# Patient Record
Sex: FEMALE | Race: WHITE | NOT HISPANIC OR LATINO | URBAN - METROPOLITAN AREA
[De-identification: names, ages, dates, MRNs, and addresses within clinical notes are randomized per-mention and may not be internally consistent; named-entity substitution may affect disease eponyms.]

---

## 2020-08-19 ENCOUNTER — INPATIENT (INPATIENT)
Facility: HOSPITAL | Age: 65
LOS: 6 days | Discharge: NURSING, REHAB OR LONG-TERM CARE | DRG: 871 | End: 2020-08-26
Attending: HOSPITALIST | Admitting: INTERNAL MEDICINE
Payer: MEDICARE

## 2020-08-19 VITALS
HEART RATE: 81 BPM | OXYGEN SATURATION: 99 % | RESPIRATION RATE: 20 BRPM | WEIGHT: 179.9 LBS | TEMPERATURE: 102 F | DIASTOLIC BLOOD PRESSURE: 101 MMHG | SYSTOLIC BLOOD PRESSURE: 131 MMHG

## 2020-08-19 DIAGNOSIS — R63.8 OTHER SYMPTOMS AND SIGNS CONCERNING FOOD AND FLUID INTAKE: ICD-10-CM

## 2020-08-19 DIAGNOSIS — R79.89 OTHER SPECIFIED ABNORMAL FINDINGS OF BLOOD CHEMISTRY: ICD-10-CM

## 2020-08-19 DIAGNOSIS — G93.41 METABOLIC ENCEPHALOPATHY: ICD-10-CM

## 2020-08-19 DIAGNOSIS — Z78.9 OTHER SPECIFIED HEALTH STATUS: Chronic | ICD-10-CM

## 2020-08-19 DIAGNOSIS — D64.9 ANEMIA, UNSPECIFIED: ICD-10-CM

## 2020-08-19 DIAGNOSIS — R74.0 NONSPECIFIC ELEVATION OF LEVELS OF TRANSAMINASE AND LACTIC ACID DEHYDROGENASE [LDH]: ICD-10-CM

## 2020-08-19 DIAGNOSIS — A41.9 SEPSIS, UNSPECIFIED ORGANISM: ICD-10-CM

## 2020-08-19 DIAGNOSIS — R74.8 ABNORMAL LEVELS OF OTHER SERUM ENZYMES: ICD-10-CM

## 2020-08-19 DIAGNOSIS — J18.9 PNEUMONIA, UNSPECIFIED ORGANISM: ICD-10-CM

## 2020-08-19 DIAGNOSIS — J96.01 ACUTE RESPIRATORY FAILURE WITH HYPOXIA: ICD-10-CM

## 2020-08-19 LAB
A1C WITH ESTIMATED AVERAGE GLUCOSE RESULT: 5.7 % — HIGH (ref 4–5.6)
ACANTHOCYTES BLD QL SMEAR: SLIGHT — SIGNIFICANT CHANGE UP
ALBUMIN SERPL ELPH-MCNC: 1.4 G/DL — LOW (ref 3.3–5)
ALBUMIN SERPL ELPH-MCNC: 2.4 G/DL — LOW (ref 3.3–5)
ALBUMIN SERPL ELPH-MCNC: 2.6 G/DL — LOW (ref 3.3–5)
ALBUMIN SERPL ELPH-MCNC: 2.9 G/DL — LOW (ref 3.3–5)
ALP SERPL-CCNC: 28 U/L — LOW (ref 40–120)
ALP SERPL-CCNC: 48 U/L — SIGNIFICANT CHANGE UP (ref 40–120)
ALP SERPL-CCNC: 52 U/L — SIGNIFICANT CHANGE UP (ref 40–120)
ALP SERPL-CCNC: 53 U/L — SIGNIFICANT CHANGE UP (ref 40–120)
ALT FLD-CCNC: 30 U/L — SIGNIFICANT CHANGE UP (ref 10–45)
ALT FLD-CCNC: 61 U/L — HIGH (ref 10–45)
ALT FLD-CCNC: 61 U/L — HIGH (ref 10–45)
ALT FLD-CCNC: 67 U/L — HIGH (ref 10–45)
AMMONIA BLD-MCNC: <10 UMOL/L — LOW (ref 11–55)
AMPHET UR-MCNC: NEGATIVE — SIGNIFICANT CHANGE UP
ANION GAP SERPL CALC-SCNC: 11 MMOL/L — SIGNIFICANT CHANGE UP (ref 5–17)
ANION GAP SERPL CALC-SCNC: 16 MMOL/L — SIGNIFICANT CHANGE UP (ref 5–17)
ANION GAP SERPL CALC-SCNC: 17 MMOL/L — SIGNIFICANT CHANGE UP (ref 5–17)
APAP SERPL-MCNC: <5 UG/ML — LOW (ref 10–30)
APPEARANCE UR: CLEAR — SIGNIFICANT CHANGE UP
APTT BLD: 30 SEC — SIGNIFICANT CHANGE UP (ref 27.5–35.5)
AST SERPL-CCNC: 124 U/L — HIGH (ref 10–40)
AST SERPL-CCNC: 137 U/L — HIGH (ref 10–40)
AST SERPL-CCNC: 156 U/L — HIGH (ref 10–40)
AST SERPL-CCNC: 62 U/L — HIGH (ref 10–40)
BACTERIA # UR AUTO: PRESENT /HPF
BARBITURATES UR SCN-MCNC: NEGATIVE — SIGNIFICANT CHANGE UP
BASE EXCESS BLDV CALC-SCNC: -11.7 MMOL/L — SIGNIFICANT CHANGE UP
BASOPHILS # BLD AUTO: 0 K/UL — SIGNIFICANT CHANGE UP (ref 0–0.2)
BASOPHILS NFR BLD AUTO: 0 % — SIGNIFICANT CHANGE UP (ref 0–2)
BENZODIAZ UR-MCNC: NEGATIVE — SIGNIFICANT CHANGE UP
BILIRUB SERPL-MCNC: 0.4 MG/DL — SIGNIFICANT CHANGE UP (ref 0.2–1.2)
BILIRUB SERPL-MCNC: 0.6 MG/DL — SIGNIFICANT CHANGE UP (ref 0.2–1.2)
BILIRUB SERPL-MCNC: 0.6 MG/DL — SIGNIFICANT CHANGE UP (ref 0.2–1.2)
BILIRUB SERPL-MCNC: 0.8 MG/DL — SIGNIFICANT CHANGE UP (ref 0.2–1.2)
BILIRUB UR-MCNC: ABNORMAL
BLD GP AB SCN SERPL QL: NEGATIVE — SIGNIFICANT CHANGE UP
BLD GP AB SCN SERPL QL: NEGATIVE — SIGNIFICANT CHANGE UP
BUN SERPL-MCNC: 30 MG/DL — HIGH (ref 7–23)
BUN SERPL-MCNC: 53 MG/DL — HIGH (ref 7–23)
BUN SERPL-MCNC: 54 MG/DL — HIGH (ref 7–23)
BUN SERPL-MCNC: 58 MG/DL — HIGH (ref 7–23)
BURR CELLS BLD QL SMEAR: PRESENT — SIGNIFICANT CHANGE UP
CALCIUM SERPL-MCNC: 4.3 MG/DL — CRITICAL LOW (ref 8.4–10.5)
CALCIUM SERPL-MCNC: 7.5 MG/DL — LOW (ref 8.4–10.5)
CALCIUM SERPL-MCNC: 8.3 MG/DL — LOW (ref 8.4–10.5)
CALCIUM SERPL-MCNC: 8.5 MG/DL — SIGNIFICANT CHANGE UP (ref 8.4–10.5)
CHLORIDE SERPL-SCNC: 100 MMOL/L — SIGNIFICANT CHANGE UP (ref 96–108)
CHLORIDE SERPL-SCNC: 102 MMOL/L — SIGNIFICANT CHANGE UP (ref 96–108)
CHLORIDE SERPL-SCNC: 122 MMOL/L — HIGH (ref 96–108)
CHLORIDE SERPL-SCNC: 92 MMOL/L — LOW (ref 96–108)
CHOLEST SERPL-MCNC: 161 MG/DL — SIGNIFICANT CHANGE UP (ref 10–199)
CK MB CFR SERPL CALC: 3.5 NG/ML — SIGNIFICANT CHANGE UP (ref 0–6.7)
CK SERPL-CCNC: 1664 U/L — HIGH (ref 25–170)
CO2 SERPL-SCNC: 13 MMOL/L — LOW (ref 22–31)
CO2 SERPL-SCNC: 19 MMOL/L — LOW (ref 22–31)
CO2 SERPL-SCNC: 22 MMOL/L — SIGNIFICANT CHANGE UP (ref 22–31)
CO2 SERPL-SCNC: 23 MMOL/L — SIGNIFICANT CHANGE UP (ref 22–31)
COCAINE METAB.OTHER UR-MCNC: NEGATIVE — SIGNIFICANT CHANGE UP
COLOR SPEC: YELLOW — SIGNIFICANT CHANGE UP
COMMENT - URINE: SIGNIFICANT CHANGE UP
CREAT SERPL-MCNC: 0.74 MG/DL — SIGNIFICANT CHANGE UP (ref 0.5–1.3)
CREAT SERPL-MCNC: 1.6 MG/DL — HIGH (ref 0.5–1.3)
CREAT SERPL-MCNC: 1.67 MG/DL — HIGH (ref 0.5–1.3)
CREAT SERPL-MCNC: 2 MG/DL — HIGH (ref 0.5–1.3)
DIFF PNL FLD: ABNORMAL
EOSINOPHIL # BLD AUTO: 0 K/UL — SIGNIFICANT CHANGE UP (ref 0–0.5)
EOSINOPHIL NFR BLD AUTO: 0 % — SIGNIFICANT CHANGE UP (ref 0–6)
EPI CELLS # UR: SIGNIFICANT CHANGE UP /HPF (ref 0–5)
ESTIMATED AVERAGE GLUCOSE: 117 MG/DL — HIGH (ref 68–114)
GIANT PLATELETS BLD QL SMEAR: PRESENT — SIGNIFICANT CHANGE UP
GLUCOSE BLDC GLUCOMTR-MCNC: 218 MG/DL — HIGH (ref 70–99)
GLUCOSE SERPL-MCNC: 116 MG/DL — HIGH (ref 70–99)
GLUCOSE SERPL-MCNC: 228 MG/DL — HIGH (ref 70–99)
GLUCOSE SERPL-MCNC: 302 MG/DL — HIGH (ref 70–99)
GLUCOSE SERPL-MCNC: SIGNIFICANT CHANGE UP (ref 70–99)
GLUCOSE UR QL: NEGATIVE — SIGNIFICANT CHANGE UP
GRAN CASTS # UR COMP ASSIST: ABNORMAL /LPF
HCO3 BLDV-SCNC: 12 MMOL/L — LOW (ref 20–27)
HCT VFR BLD CALC: 24.5 % — LOW (ref 34.5–45)
HCT VFR BLD CALC: 34.5 % — SIGNIFICANT CHANGE UP (ref 34.5–45)
HDLC SERPL-MCNC: 10 MG/DL — LOW
HGB BLD-MCNC: 11.4 G/DL — LOW (ref 11.5–15.5)
HGB BLD-MCNC: 7.8 G/DL — LOW (ref 11.5–15.5)
HYALINE CASTS # UR AUTO: ABNORMAL /LPF (ref 0–2)
INR BLD: 1.29 — HIGH (ref 0.88–1.16)
IRON SATN MFR SERPL: 13 % — LOW (ref 14–50)
IRON SATN MFR SERPL: 22 UG/DL — LOW (ref 30–160)
KETONES UR-MCNC: ABNORMAL MG/DL
LACTATE SERPL-SCNC: 2 MMOL/L — SIGNIFICANT CHANGE UP (ref 0.5–2)
LEUKOCYTE ESTERASE UR-ACNC: NEGATIVE — SIGNIFICANT CHANGE UP
LIPID PNL WITH DIRECT LDL SERPL: 82 MG/DL — SIGNIFICANT CHANGE UP
LYMPHOCYTES # BLD AUTO: 0.07 K/UL — LOW (ref 1–3.3)
LYMPHOCYTES # BLD AUTO: 0.8 % — LOW (ref 13–44)
MAGNESIUM SERPL-MCNC: 2.7 MG/DL — HIGH (ref 1.6–2.6)
MANUAL SMEAR VERIFICATION: SIGNIFICANT CHANGE UP
MCHC RBC-ENTMCNC: 29.5 PG — SIGNIFICANT CHANGE UP (ref 27–34)
MCHC RBC-ENTMCNC: 30.2 PG — SIGNIFICANT CHANGE UP (ref 27–34)
MCHC RBC-ENTMCNC: 31.8 GM/DL — LOW (ref 32–36)
MCHC RBC-ENTMCNC: 33 GM/DL — SIGNIFICANT CHANGE UP (ref 32–36)
MCV RBC AUTO: 91.3 FL — SIGNIFICANT CHANGE UP (ref 80–100)
MCV RBC AUTO: 92.8 FL — SIGNIFICANT CHANGE UP (ref 80–100)
METAMYELOCYTES # FLD: 0.9 % — HIGH (ref 0–0)
METHADONE UR-MCNC: NEGATIVE — SIGNIFICANT CHANGE UP
MONOCYTES # BLD AUTO: 0.28 K/UL — SIGNIFICANT CHANGE UP (ref 0–0.9)
MONOCYTES NFR BLD AUTO: 3.4 % — SIGNIFICANT CHANGE UP (ref 2–14)
NEUTROPHILS # BLD AUTO: 7.76 K/UL — HIGH (ref 1.8–7.4)
NEUTROPHILS NFR BLD AUTO: 90.6 % — HIGH (ref 43–77)
NEUTS BAND # BLD: 4.3 % — SIGNIFICANT CHANGE UP (ref 0–8)
NITRITE UR-MCNC: NEGATIVE — SIGNIFICANT CHANGE UP
NRBC # BLD: 0 /100 WBCS — SIGNIFICANT CHANGE UP (ref 0–0)
NT-PROBNP SERPL-SCNC: 464 PG/ML — HIGH (ref 0–300)
OPIATES UR-MCNC: NEGATIVE — SIGNIFICANT CHANGE UP
PCO2 BLDV: 20 MMHG — LOW (ref 41–51)
PCP SPEC-MCNC: SIGNIFICANT CHANGE UP
PCP UR-MCNC: NEGATIVE — SIGNIFICANT CHANGE UP
PH BLDV: 7.38 — SIGNIFICANT CHANGE UP (ref 7.32–7.43)
PH UR: 6 — SIGNIFICANT CHANGE UP (ref 5–8)
PLAT MORPH BLD: ABNORMAL
PLATELET # BLD AUTO: 174 K/UL — SIGNIFICANT CHANGE UP (ref 150–400)
PLATELET # BLD AUTO: 251 K/UL — SIGNIFICANT CHANGE UP (ref 150–400)
PO2 BLDV: 52 MMHG — SIGNIFICANT CHANGE UP
POIKILOCYTOSIS BLD QL AUTO: SIGNIFICANT CHANGE UP
POTASSIUM SERPL-MCNC: 2.2 MMOL/L — CRITICAL LOW (ref 3.5–5.3)
POTASSIUM SERPL-MCNC: 3.6 MMOL/L — SIGNIFICANT CHANGE UP (ref 3.5–5.3)
POTASSIUM SERPL-MCNC: 3.9 MMOL/L — SIGNIFICANT CHANGE UP (ref 3.5–5.3)
POTASSIUM SERPL-MCNC: 4.2 MMOL/L — SIGNIFICANT CHANGE UP (ref 3.5–5.3)
POTASSIUM SERPL-SCNC: 2.2 MMOL/L — CRITICAL LOW (ref 3.5–5.3)
POTASSIUM SERPL-SCNC: 3.6 MMOL/L — SIGNIFICANT CHANGE UP (ref 3.5–5.3)
POTASSIUM SERPL-SCNC: 3.9 MMOL/L — SIGNIFICANT CHANGE UP (ref 3.5–5.3)
POTASSIUM SERPL-SCNC: 4.2 MMOL/L — SIGNIFICANT CHANGE UP (ref 3.5–5.3)
PROT SERPL-MCNC: 4 G/DL — LOW (ref 6–8.3)
PROT SERPL-MCNC: 6.6 G/DL — SIGNIFICANT CHANGE UP (ref 6–8.3)
PROT SERPL-MCNC: 6.8 G/DL — SIGNIFICANT CHANGE UP (ref 6–8.3)
PROT SERPL-MCNC: 7.1 G/DL — SIGNIFICANT CHANGE UP (ref 6–8.3)
PROT UR-MCNC: 100 MG/DL
PROTHROM AB SERPL-ACNC: 15.3 SEC — HIGH (ref 10.6–13.6)
RBC # BLD: 2.64 M/UL — LOW (ref 3.8–5.2)
RBC # BLD: 3.78 M/UL — LOW (ref 3.8–5.2)
RBC # FLD: 13.7 % — SIGNIFICANT CHANGE UP (ref 10.3–14.5)
RBC # FLD: 14.2 % — SIGNIFICANT CHANGE UP (ref 10.3–14.5)
RBC BLD AUTO: ABNORMAL
RBC CASTS # UR COMP ASSIST: ABNORMAL /HPF
RH IG SCN BLD-IMP: POSITIVE — SIGNIFICANT CHANGE UP
RH IG SCN BLD-IMP: POSITIVE — SIGNIFICANT CHANGE UP
SALICYLATES SERPL-MCNC: <0.3 MG/DL — LOW (ref 2.8–20)
SAO2 % BLDV: 84 % — SIGNIFICANT CHANGE UP
SARS-COV-2 RNA SPEC QL NAA+PROBE: SIGNIFICANT CHANGE UP
SODIUM SERPL-SCNC: 138 MMOL/L — SIGNIFICANT CHANGE UP (ref 135–145)
SODIUM SERPL-SCNC: 142 MMOL/L — SIGNIFICANT CHANGE UP (ref 135–145)
SODIUM SERPL-SCNC: 146 MMOL/L — HIGH (ref 135–145)
SODIUM SERPL-SCNC: SIGNIFICANT CHANGE UP (ref 135–145)
SP GR SPEC: >=1.03 — SIGNIFICANT CHANGE UP (ref 1–1.03)
THC UR QL: NEGATIVE — SIGNIFICANT CHANGE UP
TIBC SERPL-MCNC: 165 UG/DL — LOW (ref 220–430)
TOTAL CHOLESTEROL/HDL RATIO MEASUREMENT: 16.1 RATIO — HIGH (ref 3.3–7.1)
TRIGL SERPL-MCNC: 343 MG/DL — HIGH (ref 10–149)
TROPONIN T SERPL-MCNC: 0.04 NG/ML — CRITICAL HIGH (ref 0–0.01)
TROPONIN T SERPL-MCNC: 0.06 NG/ML — CRITICAL HIGH (ref 0–0.01)
TROPONIN T SERPL-MCNC: <0.01 NG/ML — SIGNIFICANT CHANGE UP (ref 0–0.01)
UIBC SERPL-MCNC: 143 UG/DL — SIGNIFICANT CHANGE UP (ref 110–370)
UROBILINOGEN FLD QL: 1 E.U./DL — SIGNIFICANT CHANGE UP
WBC # BLD: 10.06 K/UL — SIGNIFICANT CHANGE UP (ref 3.8–10.5)
WBC # BLD: 8.18 K/UL — SIGNIFICANT CHANGE UP (ref 3.8–10.5)
WBC # FLD AUTO: 10.06 K/UL — SIGNIFICANT CHANGE UP (ref 3.8–10.5)
WBC # FLD AUTO: 8.18 K/UL — SIGNIFICANT CHANGE UP (ref 3.8–10.5)
WBC UR QL: < 5 /HPF — SIGNIFICANT CHANGE UP

## 2020-08-19 PROCEDURE — 72125 CT NECK SPINE W/O DYE: CPT | Mod: 26

## 2020-08-19 PROCEDURE — 72131 CT LUMBAR SPINE W/O DYE: CPT | Mod: 26

## 2020-08-19 PROCEDURE — 99223 1ST HOSP IP/OBS HIGH 75: CPT | Mod: GC

## 2020-08-19 PROCEDURE — 70450 CT HEAD/BRAIN W/O DYE: CPT | Mod: 26

## 2020-08-19 PROCEDURE — 71045 X-RAY EXAM CHEST 1 VIEW: CPT | Mod: 26

## 2020-08-19 PROCEDURE — 99291 CRITICAL CARE FIRST HOUR: CPT

## 2020-08-19 PROCEDURE — 93010 ELECTROCARDIOGRAM REPORT: CPT

## 2020-08-19 RX ORDER — ACETAMINOPHEN 500 MG
650 TABLET ORAL ONCE
Refills: 0 | Status: COMPLETED | OUTPATIENT
Start: 2020-08-19 | End: 2020-08-19

## 2020-08-19 RX ORDER — IPRATROPIUM/ALBUTEROL SULFATE 18-103MCG
3 AEROSOL WITH ADAPTER (GRAM) INHALATION ONCE
Refills: 0 | Status: COMPLETED | OUTPATIENT
Start: 2020-08-19 | End: 2020-08-19

## 2020-08-19 RX ORDER — VANCOMYCIN HCL 1 G
1000 VIAL (EA) INTRAVENOUS ONCE
Refills: 0 | Status: DISCONTINUED | OUTPATIENT
Start: 2020-08-19 | End: 2020-08-19

## 2020-08-19 RX ORDER — VANCOMYCIN HCL 1 G
1250 VIAL (EA) INTRAVENOUS ONCE
Refills: 0 | Status: COMPLETED | OUTPATIENT
Start: 2020-08-19 | End: 2020-08-19

## 2020-08-19 RX ORDER — SODIUM CHLORIDE 9 MG/ML
1000 INJECTION INTRAMUSCULAR; INTRAVENOUS; SUBCUTANEOUS ONCE
Refills: 0 | Status: COMPLETED | OUTPATIENT
Start: 2020-08-19 | End: 2020-08-19

## 2020-08-19 RX ORDER — POTASSIUM CHLORIDE 20 MEQ
10 PACKET (EA) ORAL ONCE
Refills: 0 | Status: DISCONTINUED | OUTPATIENT
Start: 2020-08-19 | End: 2020-08-19

## 2020-08-19 RX ORDER — GUAIFENESIN/DEXTROMETHORPHAN 600MG-30MG
10 TABLET, EXTENDED RELEASE 12 HR ORAL THREE TIMES A DAY
Refills: 0 | Status: DISCONTINUED | OUTPATIENT
Start: 2020-08-19 | End: 2020-08-26

## 2020-08-19 RX ORDER — AZITHROMYCIN 500 MG/1
500 TABLET, FILM COATED ORAL EVERY 24 HOURS
Refills: 0 | Status: COMPLETED | OUTPATIENT
Start: 2020-08-19 | End: 2020-08-21

## 2020-08-19 RX ORDER — SODIUM CHLORIDE 9 MG/ML
2500 INJECTION INTRAMUSCULAR; INTRAVENOUS; SUBCUTANEOUS ONCE
Refills: 0 | Status: COMPLETED | OUTPATIENT
Start: 2020-08-19 | End: 2020-08-19

## 2020-08-19 RX ORDER — POTASSIUM CHLORIDE 20 MEQ
10 PACKET (EA) ORAL ONCE
Refills: 0 | Status: COMPLETED | OUTPATIENT
Start: 2020-08-19 | End: 2020-08-19

## 2020-08-19 RX ORDER — ACETAMINOPHEN 500 MG
650 TABLET ORAL EVERY 4 HOURS
Refills: 0 | Status: DISCONTINUED | OUTPATIENT
Start: 2020-08-19 | End: 2020-08-26

## 2020-08-19 RX ORDER — FUROSEMIDE 40 MG
20 TABLET ORAL ONCE
Refills: 0 | Status: COMPLETED | OUTPATIENT
Start: 2020-08-19 | End: 2020-08-19

## 2020-08-19 RX ORDER — PIPERACILLIN AND TAZOBACTAM 4; .5 G/20ML; G/20ML
3.38 INJECTION, POWDER, LYOPHILIZED, FOR SOLUTION INTRAVENOUS ONCE
Refills: 0 | Status: COMPLETED | OUTPATIENT
Start: 2020-08-19 | End: 2020-08-19

## 2020-08-19 RX ORDER — CEFTRIAXONE 500 MG/1
2000 INJECTION, POWDER, FOR SOLUTION INTRAMUSCULAR; INTRAVENOUS EVERY 24 HOURS
Refills: 0 | Status: COMPLETED | OUTPATIENT
Start: 2020-08-19 | End: 2020-08-23

## 2020-08-19 RX ADMIN — AZITHROMYCIN 255 MILLIGRAM(S): 500 TABLET, FILM COATED ORAL at 19:30

## 2020-08-19 RX ADMIN — Medication 1250 MILLIGRAM(S): at 16:52

## 2020-08-19 RX ADMIN — PIPERACILLIN AND TAZOBACTAM 200 GRAM(S): 4; .5 INJECTION, POWDER, LYOPHILIZED, FOR SOLUTION INTRAVENOUS at 11:55

## 2020-08-19 RX ADMIN — SODIUM CHLORIDE 1000 MILLILITER(S): 9 INJECTION INTRAMUSCULAR; INTRAVENOUS; SUBCUTANEOUS at 16:47

## 2020-08-19 RX ADMIN — Medication 10 MILLILITER(S): at 19:33

## 2020-08-19 RX ADMIN — Medication 100 MILLIEQUIVALENT(S): at 14:19

## 2020-08-19 RX ADMIN — Medication 650 MILLIGRAM(S): at 11:56

## 2020-08-19 RX ADMIN — SODIUM CHLORIDE 2500 MILLILITER(S): 9 INJECTION INTRAMUSCULAR; INTRAVENOUS; SUBCUTANEOUS at 11:57

## 2020-08-19 RX ADMIN — PIPERACILLIN AND TAZOBACTAM 3.38 GRAM(S): 4; .5 INJECTION, POWDER, LYOPHILIZED, FOR SOLUTION INTRAVENOUS at 13:15

## 2020-08-19 RX ADMIN — Medication 10 MILLIEQUIVALENT(S): at 14:56

## 2020-08-19 RX ADMIN — Medication 650 MILLIGRAM(S): at 21:51

## 2020-08-19 RX ADMIN — Medication 10 MILLILITER(S): at 21:33

## 2020-08-19 RX ADMIN — Medication 650 MILLIGRAM(S): at 22:30

## 2020-08-19 RX ADMIN — Medication 3 MILLILITER(S): at 18:16

## 2020-08-19 RX ADMIN — Medication 166.67 MILLIGRAM(S): at 13:14

## 2020-08-19 RX ADMIN — CEFTRIAXONE 100 MILLIGRAM(S): 500 INJECTION, POWDER, FOR SOLUTION INTRAMUSCULAR; INTRAVENOUS at 21:33

## 2020-08-19 RX ADMIN — Medication 20 MILLIGRAM(S): at 18:50

## 2020-08-19 RX ADMIN — Medication 650 MILLIGRAM(S): at 12:40

## 2020-08-19 NOTE — ED PROVIDER NOTE - CARE PLAN
Principal Discharge DX:	Sepsis  Secondary Diagnosis:	Pneumonia Principal Discharge DX:	Sepsis  Secondary Diagnosis:	Pneumonia  Secondary Diagnosis:	Dehydration

## 2020-08-19 NOTE — H&P ADULT - NSHPLABSRESULTS_GEN_ALL_CORE
.  LABS:                         7.8    8.18  )-----------( 174      ( 19 Aug 2020 11:46 )             24.5     08-19    142  |  102  |  54<H>  ----------------------------<  228<H>  3.9   |  23  |  1.67<H>    Ca    8.5      19 Aug 2020 13:49  Mg     2.7     08-19    TPro  7.1  /  Alb  2.9<L>  /  TBili  0.8  /  DBili  x   /  AST  124<H>  /  ALT  61<H>  /  AlkPhos  52  08-19    PT/INR - ( 19 Aug 2020 11:46 )   PT: 15.3 sec;   INR: 1.29          PTT - ( 19 Aug 2020 11:46 )  PTT:30.0 sec  Urinalysis Basic - ( 19 Aug 2020 12:22 )    Color: Yellow / Appearance: Clear / SG: >=1.030 / pH: x  Gluc: x / Ketone: Trace mg/dL  / Bili: Moderate / Urobili: 1.0 E.U./dL   Blood: x / Protein: 100 mg/dL / Nitrite: NEGATIVE   Leuk Esterase: NEGATIVE / RBC: 5-10 /HPF / WBC < 5 /HPF   Sq Epi: x / Non Sq Epi: 0-5 /HPF / Bacteria: Present /HPF      CARDIAC MARKERS ( 19 Aug 2020 13:49 )  x     / 0.06 ng/mL / 4072 U/L / x     / x      CARDIAC MARKERS ( 19 Aug 2020 11:46 )  x     / <0.01 ng/mL / 1664 U/L / x     / 3.5 ng/mL      Serum Pro-Brain Natriuretic Peptide: 464 pg/mL (08-19 @ 11:46)    Lactate, Blood: 2.0 mmol/L (08-19 @ 11:43)    EKG: sinus tachycardia  CXR: LLL opacity  CTH: no acute pathology  CT C-spine: mild chord compression  CT L-spine: At the L4/5 level, there is minimum disc bulge eccentric to the right which abuts the right L4 nerve root

## 2020-08-19 NOTE — H&P ADULT - NSHPPHYSICALEXAM_GEN_ALL_CORE
.  VITAL SIGNS:  T(C): 36.7 (08-19-20 @ 18:21), Max: 38.8 (08-19-20 @ 11:11)  T(F): 98.1 (08-19-20 @ 18:21), Max: 101.8 (08-19-20 @ 11:11)  HR: 116 (08-19-20 @ 18:21) (81 - 118)  BP: 125/84 (08-19-20 @ 18:21) (110/57 - 131/101)  BP(mean): --  RR: 22 (08-19-20 @ 18:21) (18 - 22)  SpO2: 94% (08-19-20 @ 18:21) (94% - 100%)  Wt(kg): --    PHYSICAL EXAM:    Constitutional: obese woman on 2LNC, coughing consistently and unable to speak in complete sentences due to cough  Head: NC/AT  ENT: dry mucous membranes  Neck: supple; no JVD or thyromegaly  Respiratory: diffuse rhonchi and coarse breath sounds. crackles in posterior lung fields b/l  Cardiac: +S1/S2; RRR; no M/R/G; PMI non-displaced  Gastrointestinal: soft, NT/ND; no rebound or guarding; +BSx4  Extremities: extremities cool to touch; no peripheral edema  Vascular: 2+ radial, femoral, DP/PT pulses B/L  Dermatologic: stage 2 sacral decubitus ulcer  Neurologic: AAOx3; CNII-XII grossly intact; no focal deficits

## 2020-08-19 NOTE — ED ADULT NURSE NOTE - CHEST APPEARANCE
"Met with the patient in the office after a referral from Bria Odom, PhD for possible financial assistance. The patient is living with her daughters ages 16 and 18. She is still working full time as a teacher and informed that she would like to work as long as possible. The patient stated that her life "is still the same as before", but she now had co-payments to take care of. She did not qualify for Medicaid. Provided her with contact information for the  at [facility] and encouraged her to request an application for [facility]. Also provided her with an application for Financial Assistance through [email or web address]. Offered services to the patient and provided her with contact information. She agreed to call back for further assistance as needed.   " normal

## 2020-08-19 NOTE — H&P ADULT - PROBLEM SELECTOR PLAN 1
-SIRS criteria met: Tmax 100.8, , RR 20)  -likely PNA given LLL opacity on CXR with new onset productive cough and fevers  -UA negative  -s/p 3.7L NS, vanc, zosyn in ED    Plan:  -tx CAP as discussed below  -cautious with fluids given possible overload status

## 2020-08-19 NOTE — ED PROVIDER NOTE - PHYSICAL EXAMINATION
GEN: obese, ill-apearing, awake, alert, oriented to person, febrile and confused  ENT: Airway patent, Nasal mucosa clear. Mouth with dry mucosa.  EYES: crusted eyelids, PERRL, EOMI  RESPIRATORY: rhonchi and crackels b/l, LLL crackles. mild tachypnea, no hypoxia or resp distress.  CARDIAC: sius tach no m/r/g.  ABDOMEN: Soft, nontender, +bowel sounds, no rebound, rigidity, or guarding.  MSK: Range of motion is not limited, no deformities noted. Mild LS TTP.  NEURO: Alert and oriented x 1-2 generally weak, pt confused and not following commands.  SKIN: Skin normal color for race, warm with mottled extremities. Abrasion noted to mid low back with LS TTP, no step off or deformities.

## 2020-08-19 NOTE — H&P ADULT - PROBLEM SELECTOR PLAN 10
F: caution with fluids given overload status  E: replete PRN  N: pending bedside dysphagia screen  DVT ppx: lovenox  GI ppx: not indicated    Code Status: Full Code  Dispo: [facility] F: caution with fluids given overload status  E: replete PRN  N: pending bedside dysphagia screen  DVT ppx: SCDs hold off chemical due to anemia of unknown cause  GI ppx: not indicated    Code Status: Full Code  Dispo: [id number]

## 2020-08-19 NOTE — CONSULT NOTE ADULT - ASSESSMENT
65F with no significant PMHx, BIBEMS after being found down at home for ~3d, complaining of confusion, cough and back pain, admitted for sepsis 2/2 LLL pneumonia. ICU was consulted for AMS and sepsis.    PLAN:    NEURO:  #AMS, likely 2/2 sepsis 2/2 PNA  -Pt was found down for ~3d with AMS, unknown when last normal. CTH negative for any acute pathology, tox screen negative.  -Treat sepsis as below.    PULMONARY:  #LLL PNA, likely CAP as pt has been home with no recent hospitalizations  -Pt presenting w sepsis (Tmax 100.8, , RR 20), AMS, productive cough and LLL opacity on CXR c/w PNA. Received 1 dose of Vanc/Zosyn in ED, 1L NS. Currently able to protect airway.   -f/u blood cultures  -f/u procalcitonin  -please send sputum culture  -treat as CAP with Ceftriaxone and Azithromycin, and adjust as needed per culture sensitivities/specificities  -supplemental O2 as needed    CARDIOVASCULAR:  #sinus tachycardia  -likely secondary to sepsis  -continue to treat PNA as above  -pt with no known cardiac history, however sounded fluid overloaded on exam after receiving 1L NS  -consider ECHO    RENAL:  #HERI (Cr 1.67, unknown baseline)  -likely pre-renal 2/2 dehydration and sepsis, obtain urine lytes to confirm    ID:  #sepsis 2/2 CAP  -management as above    GI:  -no active issues    :  -no active issues    DISPO - RMF 65F with no significant PMHx, BIBEMS after being found down at home for ~3d, complaining of confusion, cough and back pain, admitted for sepsis 2/2 LLL pneumonia. ICU was consulted for AMS and sepsis.    PLAN:    NEURO:  #metabolic encephalopathy 2/2 sepsis 2/2 PNA  -Pt was found down for ~3d with AMS, unknown when last normal. CTH negative for any acute pathology, tox screen negative.  -Treat sepsis as below.    PULMONARY:  #LLL PNA, likely CAP as pt has been home with no recent hospitalizations  -Pt presenting w sepsis (Tmax 100.8, , RR 20), AMS, productive cough and LLL opacity on CXR c/w PNA. Received 1 dose of Vanc/Zosyn in ED, 1L NS. Currently able to protect airway.   -f/u blood cultures  -f/u procalcitonin  -please send sputum culture  -treat as CAP with Ceftriaxone and Azithromycin, and adjust as needed per culture sensitivities/specificities  -supplemental O2 as needed    CARDIOVASCULAR:  #sinus tachycardia  -likely secondary to sepsis  -continue to treat PNA as above  -pt with no known cardiac history, however sounded fluid overloaded on exam after receiving 1L NS  -consider ECHO    RENAL:  #HERI (Cr 1.67, unknown baseline)  -likely pre-renal 2/2 dehydration and sepsis, obtain urine lytes to confirm    ID:  #sepsis 2/2 CAP (met 3 sepsis criteria: Tmax 100.8, , RR 20)  -management as above    GI:  -no active issues    :  -no active issues    DISPO - RMF 65F with no significant PMHx, BIBEMS after being found down at home for ~3d, complaining of confusion, cough and back pain, admitted for sepsis 2/2 LLL pneumonia. ICU was consulted for AMS and sepsis.    PLAN:    NEURO:  #metabolic encephalopathy 2/2 sepsis 2/2 PNA  -Pt was found down for ~3d, with no known/endorsed fall/trauma, cardiac or neurological pathology, no electrolyte abnormalities, no evidence of toxic ingestion.   (Initial BMP showing hypokalemia which would cause muscle weakness and could potentially lead to a fall, however, most likely lab error, as repeat BMP serum potassium wnl)  -management of sepsis 2/2 CAP as below    PULMONARY:  #LLL PNA, likely CAP as pt has been home with no recent hospitalizations  -Pt presenting w sepsis (Tmax 100.8, , RR 20), AMS, productive cough and LLL opacity on CXR c/w PNA. Received 1 dose of Vanc/Zosyn in ED, 1L NS. Currently able to protect airway.   -f/u blood cultures  -f/u procalcitonin  -please send sputum culture  -treat as CAP with Ceftriaxone and Azithromycin, and adjust as needed per culture sensitivities/specificities  -supplemental O2 as needed    CARDIOVASCULAR:  #sinus tachycardia  -likely secondary to sepsis  -continue to treat PNA as above  -pt with no known cardiac history, however sounded fluid overloaded on exam after receiving 1L NS  -consider ECHO    RENAL:  #HERI (Cr 1.67, unknown baseline)  -likely pre-renal 2/2 dehydration and sepsis, obtain urine lytes to confirm    ID:  #sepsis 2/2 CAP (met 3 sepsis criteria: Tmax 100.8, , RR 20)  -management as above    GI:  -no active issues    :  -no active issues    DISPO - RMF 65F with no significant PMHx, BIBEMS after being found down at home for ~3d, complaining of confusion, cough and back pain, admitted for sepsis 2/2 LLL pneumonia. ICU was consulted for AMS and sepsis.    PLAN:    NEURO:  #metabolic encephalopathy 2/2 sepsis 2/2 PNA  -Pt was found down for ~3d, with no known/endorsed fall/trauma, cardiac or neurological pathology, no electrolyte abnormalities, no evidence of toxic ingestion.   (Initial BMP showing hypokalemia which would cause muscle weakness and could potentially lead to a fall, however, most likely lab error, as repeat BMP serum potassium wnl)  -management of sepsis 2/2 CAP as below    PULMONARY:  #LLL PNA, likely CAP as pt has been home with no recent hospitalizations  -Pt presenting w sepsis (Tmax 100.8, , RR 20), AMS, productive cough and LLL opacity on CXR c/w PNA. Received 1 dose of Vanc/Zosyn in ED, 1L NS. Currently able to protect airway.   -f/u blood cultures  -f/u procalcitonin  -please send sputum culture  -treat as CAP with Ceftriaxone and Azithromycin, and adjust as needed per culture sensitivities/specificities  -supplemental O2 as needed    CARDIOVASCULAR:  #sinus tachycardia  -likely secondary to sepsis  -continue to treat PNA as above    #elevated troponin  -initial labs showing troponin 0.01, however on repeat, troponin 0.06, bnp 464  -could be demand ischemia, trend to peak  -pt with no known history of HF, however sounded fluid overloaded on exam after receiving 1L NS  -consider ECHO    RENAL:  #HERI (Cr 1.67, unknown baseline)  -likely pre-renal 2/2 dehydration and sepsis, obtain urine lytes to confirm    ID:  #sepsis 2/2 CAP (met 3 sepsis criteria: Tmax 100.8, , RR 20)  -management as above    GI:  -no active issues    :  -no active issues    DISPO - RMF

## 2020-08-19 NOTE — CONSULT NOTE ADULT - SUBJECTIVE AND OBJECTIVE BOX
[facility] SERVICE CONSULTATION NOTE    CC:    HPI:    ROS:  Otherwise negative, except as specified in HPI.    PMH:    PSH:    FH:    SH:    ALLERGIES:    MEDICATIONS:    VITAL SIGNS:  ICU Vital Signs Last 24 Hrs  T(C): 37.2 (19 Aug 2020 14:11), Max: 38.8 (19 Aug 2020 11:11)  T(F): 98.9 (19 Aug 2020 14:11), Max: 101.8 (19 Aug 2020 11:11)  HR: 108 (19 Aug 2020 13:24) (81 - 118)  BP: 110/57 (19 Aug 2020 13:24) (110/57 - 131/101)  BP(mean): --  ABP: --  ABP(mean): --  RR: 20 (19 Aug 2020 13:24) (20 - 20)  SpO2: 99% (19 Aug 2020 13:24) (98% - 99%)    CAPILLARY BLOOD GLUCOSE          PHYSICAL EXAM:  Constitutional: resting comfortably in bed, NAD  HEENT: NC/AT; PERRL, anicteric sclera; no oropharyngeal erythema or exudates; MMM  Neck: supple, no appreciable JVD  Respiratory: CTA B/L, no W/R/R; respirations appear non-labored, conversive in full sentences  Cardiovascular: +S1/S2, RRR  Gastrointestinal: abdomen soft, NT/ND  Extremities: WWP; no clubbing, cyanosis or edema  Vascular: 2+ radial, femoral, and DP/PT pulses B/L  Dermatologic: skin normal color and turgor; no visible rashes  Neurological:     LABS:                        7.8    8.18  )-----------( 174      ( 19 Aug 2020 11:46 )             24.5     08-19    142  |  102  |  54<H>  ----------------------------<  228<H>  3.9   |  23  |  1.67<H>    Ca    8.5      19 Aug 2020 13:49  Mg     2.7     08-19    TPro  7.1  /  Alb  2.9<L>  /  TBili  0.8  /  DBili  x   /  AST  124<H>  /  ALT  61<H>  /  AlkPhos  52  08-19    PT/INR - ( 19 Aug 2020 11:46 )   PT: 15.3 sec;   INR: 1.29          PTT - ( 19 Aug 2020 11:46 )  PTT:30.0 sec  Lactate, Blood: 2.0 mmol/L (08-19-20 @ 11:43)    CARDIAC MARKERS ( 19 Aug 2020 11:46 )  x     / <0.01 ng/mL / 1664 U/L / x     / 3.5 ng/mL      Urinalysis Basic - ( 19 Aug 2020 12:22 )    Color: Yellow / Appearance: Clear / SG: >=1.030 / pH: x  Gluc: x / Ketone: Trace mg/dL  / Bili: Moderate / Urobili: 1.0 E.U./dL   Blood: x / Protein: 100 mg/dL / Nitrite: NEGATIVE   Leuk Esterase: NEGATIVE / RBC: 5-10 /HPF / WBC < 5 /HPF   Sq Epi: x / Non Sq Epi: 0-5 /HPF / Bacteria: Present /HPF          EKG: Reviewed.    RADIOLOGY & ADDITIONAL TESTS: Reviewed. [facility] SERVICE CONSULTATION NOTE    CC: confusion, cough    HPI: History obtained from chart and patient, limited by patient's confusion. 65F who denies PMHX, lives alone,  BIBEMS for AMS. Per EMS, someone in her building noticed she had a [employer] delivery outside her door from Sunday (3 days ago) so they checked in on her and found her down on the ground. Pt has no recollection of recent events leading up to her hospitalization. She states "I guess I must have passed out." She endorses feeling weak, productive cough, back pain and thirst. Denies any lightheadedness, fevers, HA, dizziness, CP, SOB, N/V/D/C, abdominal pain, dysuria, sick contacts or recent travel. Denies alcohol or illicit drug use.     ED Vitals: Tmax 100.8,  -130/ RR 20 SpO2 98% on 2L NC  ED Labs: WBC 8.18 with neutrophilic predominance, H/H 7.8/24.5 BUN/Cr 54/1.67 Glucose 228 lactate wnl AST//61, CK 1664, proBNP 464, trop wnl, negative tox screen  CXR: LLL opacity  EKG: sinus tachycardia  CTH: no acute pathology  ED Course: Received Tylenol 650mg, Vancomycin 1250mg, Zosyn 3.375g, 1L NS    ROS:  Otherwise negative, except as specified in HPI.    PMH: no significant PMH    PSH: no significant PSH    FH: diabetes, CAD    SH: Denies smoking, alcohol or illicit drug use. Lives alone. Closest relative is a sister who lives in [place], who has not spoken to her in 2 years.     ALLERGIES: NKDA    MEDICATIONS: none    VITAL SIGNS:  ICU Vital Signs Last 24 Hrs  T(C): 37.2 (19 Aug 2020 14:11), Max: 38.8 (19 Aug 2020 11:11)  T(F): 98.9 (19 Aug 2020 14:11), Max: 101.8 (19 Aug 2020 11:11)  HR: 108 (19 Aug 2020 13:24) (81 - 118)  BP: 110/57 (19 Aug 2020 13:24) (110/57 - 131/101)  BP(mean): --  ABP: --  ABP(mean): --  RR: 20 (19 Aug 2020 13:24) (20 - 20)  SpO2: 99% (19 Aug 2020 13:24) (98% - 99%)    CAPILLARY BLOOD GLUCOSE          PHYSICAL EXAM:  Constitutional: resting comfortably in bed, NAD  HEENT: NC/AT; PERRL, anicteric sclera; no oropharyngeal erythema or exudates; MMM  Neck: supple, no appreciable JVD  Respiratory: tachypneic, b/l crackles, no wheezing, rales, rhonchi, able to speak in full sentences on 2L NC, no accessory muscle use  Cardiovascular: +S1/S2, RRR, no m/r/g  Gastrointestinal: abdomen soft, NT/ND, BS+4  Back: bruise and abrasion on L upper back  Extremities: cool to touch; no clubbing, cyanosis or edema  Vascular: 2+ radial, femoral, DP/PT pulses B/L  Dermatologic: skin normal color and turgor; no visible rashes  Neurological: AAOx2; patient intermittently confused, cranial nerves grossly intact, no focal neurological deficits    LABS:                        7.8    8.18  )-----------( 174      ( 19 Aug 2020 11:46 )             24.5     08-19    142  |  102  |  54<H>  ----------------------------<  228<H>  3.9   |  23  |  1.67<H>    Ca    8.5      19 Aug 2020 13:49  Mg     2.7     08-19    TPro  7.1  /  Alb  2.9<L>  /  TBili  0.8  /  DBili  x   /  AST  124<H>  /  ALT  61<H>  /  AlkPhos  52  08-19    PT/INR - ( 19 Aug 2020 11:46 )   PT: 15.3 sec;   INR: 1.29          PTT - ( 19 Aug 2020 11:46 )  PTT:30.0 sec  Lactate, Blood: 2.0 mmol/L (08-19-20 @ 11:43)    CARDIAC MARKERS ( 19 Aug 2020 11:46 )  x     / <0.01 ng/mL / 1664 U/L / x     / 3.5 ng/mL      Urinalysis Basic - ( 19 Aug 2020 12:22 )    Color: Yellow / Appearance: Clear / SG: >=1.030 / pH: x  Gluc: x / Ketone: Trace mg/dL  / Bili: Moderate / Urobili: 1.0 E.U./dL   Blood: x / Protein: 100 mg/dL / Nitrite: NEGATIVE   Leuk Esterase: NEGATIVE / RBC: 5-10 /HPF / WBC < 5 /HPF   Sq Epi: x / Non Sq Epi: 0-5 /HPF / Bacteria: Present /HPF          EKG: Reviewed.    RADIOLOGY & ADDITIONAL TESTS: Reviewed.

## 2020-08-19 NOTE — H&P ADULT - ASSESSMENT
65F with no significant PMHx, BIBEMS after being found down at home for ~3d, complaining of confusion, cough and back pain, admitted for sepsis 2/2 LLL pneumonia. ICU was consulted for AMS and sepsis. 65F with no significant PMHx, BIBEMS after being found down at home for ~3days, complaining of confusion, cough and back pain, admitted for sepsis 2/2 LLL pneumonia s/p fluids in ED with possible fluid overload.

## 2020-08-19 NOTE — H&P ADULT - HISTORY OF PRESENT ILLNESS
HPI: History obtained from chart and patient, limited by patient's confusion. 65F who denies PMHX, lives alone,  BIBEMS for AMS. Per EMS, someone in her building noticed she had a [employer] delivery outside her door from Sunday (3 days ago) so they checked in on her and found her down on the ground. Pt has no recollection of recent events leading up to her hospitalization. She states "I guess I must have passed out." She endorses feeling weak, productive cough, back pain and thirst. Denies any lightheadedness, fevers, HA, dizziness, CP, SOB, N/V/D/C, abdominal pain, dysuria, sick contacts or recent travel. Denies alcohol or illicit drug use.     ED Course:  Vitals: Tmax 100.8,  -130/ RR 20 SpO2 98% on 2L NC  Labs: WBC 8.18 with neutrophilic predominance, H/H 7.8/24.5 BUN/Cr 54/1.67 Glucose 228 lactate wnl AST//61, CK 1664, proBNP 464, trop wnl, negative tox screen  Imaging:  CXR: LLL opacity  EKG: sinus tachycardia  CTH: no acute pathology  CT C-spine: mild chord compression  CT L-spine: At the L4/5 level, there is minimum disc bulge eccentric to the right which abuts the right L4 nerve root  Treatment: Received Tylenol 650mg, Vancomycin 1250mg, Zosyn 3.375g, 1L NS HPI: History obtained from chart and patient, limited by patient's confusion. 65F who denies PMHX, lives alone, BIBEMS for AMS. Per EMS, someone in her building noticed she had a [employer] delivery outside her door from Sunday (3 days ago) so they checked in on her and found her down on the ground. Pt has no recollection of recent events leading up to her hospitalization. She states "I guess I got dehydrated and passed out." She endorses feeling weak, productive cough, back pain and thirst. Denies any lightheadedness, fevers, HA, dizziness, CP, SOB, N/V/D/C, abdominal pain, dysuria, sick contacts or recent travel. Denies alcohol or illicit drug use.     ED Course:  Vitals: Tmax 100.8,  -130/ RR 20 SpO2 98% on 2L NC  Labs: WBC 8.18 with neutrophilic predominance, H/H 7.8/24.5 BUN/Cr 54/1.67 Glucose 228 lactate wnl AST//61, CK 1664, proBNP 464, trop wnl, negative tox screen  Imaging:  CXR: LLL opacity, cardiomegaly  EKG: sinus tachycardia  CTH: no acute pathology  CT C-spine: mild chord compression  CT L-spine: At the L4/5 level, there is minimum disc bulge eccentric to the right which abuts the right L4 nerve root  Treatment: Received Tylenol 650mg, Vancomycin 1250mg, Zosyn 3.375g, 3.7L NS

## 2020-08-19 NOTE — ED ADULT TRIAGE NOTE - CHIEF COMPLAINT QUOTE
pt was found of floor by EMS, as per EMS pt had food delivered Sunday to front door & pt never picked up the food. pt was last seen normal Sunday. pt is aaox2

## 2020-08-19 NOTE — CONSULT NOTE ADULT - SUBJECTIVE AND OBJECTIVE BOX
Orthopaedic Surgery Consult Note    For Surgeon:    HPI:  [id number]  Patient is a 65y old  Female who presents with a chief complaint of AMS, cough (19 Aug 2020 17:39)    HPI:  HPI: History obtained from chart and patient, limited by patient's confusion. 65F who denies PMHX, lives alone, BIBEMS for AMS. Per EMS, someone in her building noticed she had a [employer] delivery outside her door from Sunday (3 days ago) so they checked in on her and found her down on the ground. Pt has no recollection of recent events leading up to her hospitalization. She states "I guess I got dehydrated and passed out." She endorses feeling weak, productive cough, back pain and thirst. Denies any lightheadedness, fevers, HA, dizziness, CP, SOB, N/V/D/C, abdominal pain, dysuria, sick contacts or recent travel. Denies alcohol or illicit drug use.     ED Course:  Vitals: Tmax 100.8,  -130/ RR 20 SpO2 98% on 2L NC  Labs: WBC 8.18 with neutrophilic predominance, H/H 7.8/24.5 BUN/Cr 54/1.67 Glucose 228 lactate wnl AST//61, CK 1664, proBNP 464, trop wnl, negative tox screen  Imaging:  CXR: LLL opacity, cardiomegaly  EKG: sinus tachycardia  CTH: no acute pathology  CT C-spine: mild chord compression  CT L-spine: At the L4/5 level, there is minimum disc bulge eccentric to the right which abuts the right L4 nerve root  Treatment: Received Tylenol 650mg, Vancomycin 1250mg, Zosyn 3.375g, 3.7L NS (19 Aug 2020 17:39)      Allergies    No Known Allergies    Intolerances      PAST MEDICAL & SURGICAL HISTORY:  Known health problems: none  No known problems    MEDICATIONS  (STANDING):  azithromycin  IVPB 500 milliGRAM(s) IV Intermittent every 24 hours  cefTRIAXone   IVPB 2000 milliGRAM(s) IV Intermittent every 24 hours    MEDICATIONS  (PRN):  acetaminophen   Tablet .. 650 milliGRAM(s) Oral every 4 hours PRN Mild Pain (1 - 3)  benzonatate 100 milliGRAM(s) Oral three times a day PRN Cough  guaifenesin/dextromethorphan  Syrup 10 milliLiter(s) Oral three times a day PRN Cough      Vital Signs Last 24 Hrs  T(C): 38.1 (19 Aug 2020 20:53), Max: 38.8 (19 Aug 2020 11:11)  T(F): 100.5 (19 Aug 2020 20:53), Max: 101.8 (19 Aug 2020 11:11)  HR: 118 (19 Aug 2020 20:53) (81 - 118)  BP: 114/74 (19 Aug 2020 20:53) (110/57 - 131/101)  BP(mean): --  RR: 22 (19 Aug 2020 20:53) (18 - 22)  SpO2: 94% (19 Aug 2020 20:53) (94% - 100%)    Physical Exam:                          7.8    8.18  )-----------( 174      ( 19 Aug 2020 11:46 )             24.5     08-19    See Note  |  92<L>  |  53<H>  ----------------------------<  See Note  4.2   |  19<L>  |  1.60<H>    Ca    7.5<L>      19 Aug 2020 20:45  Mg     2.7     08-19    TPro  6.8  /  Alb  2.4<L>  /  TBili  0.6  /  DBili  x   /  AST  137<H>  /  ALT  61<H>  /  AlkPhos  48  08-19    PT/INR - ( 19 Aug 2020 11:46 )   PT: 15.3 sec;   INR: 1.29          PTT - ( 19 Aug 2020 11:46 )  PTT:30.0 sec  Imaging:     A/P: 65yFemale    -Discussed with  Orthopaedic Surgery Consult Note    For Surgeon: [place]    HPI:  65F very lethargic and poor historian admitted to medicine for altered mental status. Ortho spine consulted for CT C spine findings of mild cord compression. Patient seen and examined at bedside. Appear lethargic and unable to obtain history. Per reports, was found down in her apartment, unknown cause or length of time down. Denies neck pain, back pain, numbness or tingling in bilateral upper extremities. Denies any recent falls or trauma. No MVC.       Allergies    No Known Allergies    Intolerances      PAST MEDICAL & SURGICAL HISTORY:  Known health problems: none  No known problems    MEDICATIONS  (STANDING):  azithromycin  IVPB 500 milliGRAM(s) IV Intermittent every 24 hours  cefTRIAXone   IVPB 2000 milliGRAM(s) IV Intermittent every 24 hours    MEDICATIONS  (PRN):  acetaminophen   Tablet .. 650 milliGRAM(s) Oral every 4 hours PRN Mild Pain (1 - 3)  benzonatate 100 milliGRAM(s) Oral three times a day PRN Cough  guaifenesin/dextromethorphan  Syrup 10 milliLiter(s) Oral three times a day PRN Cough      Vital Signs Last 24 Hrs  T(C): 38.1 (19 Aug 2020 20:53), Max: 38.8 (19 Aug 2020 11:11)  T(F): 100.5 (19 Aug 2020 20:53), Max: 101.8 (19 Aug 2020 11:11)  HR: 118 (19 Aug 2020 20:53) (81 - 118)  BP: 114/74 (19 Aug 2020 20:53) (110/57 - 131/101)  BP(mean): --  RR: 22 (19 Aug 2020 20:53) (18 - 22)  SpO2: 94% (19 Aug 2020 20:53) (94% - 100%)    Physical Exam:  Gen: Lethargic but arousable   Neck supple  Nontender to palpation   Midline with no step offs  BL UE sensation intact and symmetric   BL UE motor strength intact and symmetric   2+ rad pulse  No lane sign   Biceps and triceps reflexes intact  No focal deficits   No clonus or hyperreflexia                         7.8    8.18  )-----------( 174      ( 19 Aug 2020 11:46 )             24.5     08-19    See Note  |  92<L>  |  53<H>  ----------------------------<  See Note  4.2   |  19<L>  |  1.60<H>    Ca    7.5<L>      19 Aug 2020 20:45  Mg     2.7     08-19    TPro  6.8  /  Alb  2.4<L>  /  TBili  0.6  /  DBili  x   /  AST  137<H>  /  ALT  61<H>  /  AlkPhos  48  08-19    PT/INR - ( 19 Aug 2020 11:46 )   PT: 15.3 sec;   INR: 1.29          PTT - ( 19 Aug 2020 11:46 )  PTT:30.0 sec  Imaging:   CT C-spine:  Impression: Multilevel degenerative changes of the cervical spine with no evidence of acute fractures.     C4/C5 disc osteophyte complex with moderate to severe right and mild left foraminal narrowing. C4/C5 mild spinal cord compression.     C5/C6 disc osteophyte complex with moderate to severe bilateral foraminal narrowing. C5/C6 mild spinal cord compression.     C6/C7 disc osteophyte complex with moderate to severe left and moderate right foraminal narrowing. C6/C7 mild spinal cord compression.   CT L-spine  IMPRESSION: No fracture. Multilevel degenerative disc disease. Disc bulge at L4/5 abutting the right L4 nerve root and correlation with the patient's clinical symptoms is recommended.      A/P: 65F admitted for AMS and workup CT C-spine showing C4-5, C5-6 and C6-7 mild spinal cord compression with no focal deficits     - recommend MRI C, T and L spine to further evaluate  - rest of recs pending MRI results   - Ortho Spine will continue to follow    -Discussed with Dr. Brannon

## 2020-08-19 NOTE — ED ADULT NURSE NOTE - INTERVENTIONS DEFINITIONS
Call bell, personal items and telephone within reach/Instruct patient to call for assistance/Physically safe environment: no spills, clutter or unnecessary equipment/Provide visual clues: red socks/Monitor for mental status changes and reorient to person, place, and time/Reinforce activity limits and safety measures with patient and family/Review medications for side effects contributing to fall risk/[place] to call system/Non-slip footwear when patient is off stretcher/Provide visual cue, wrist band, yellow gown, etc./Room bathroom lighting operational/Stretcher in lowest position, wheels locked, appropriate side rails in place/Monitor gait and stability

## 2020-08-19 NOTE — H&P ADULT - PROBLEM SELECTOR PLAN 8
Hb 7.8  -no signs of active bleed (melena, BRBPR, menorrhagia)  -likely chronic    Plan:  -trend hb  -maintain T+S  -transfuse for goal Hb>7  -f/u Fe studies Hb 7.8  -no signs of active bleed (melena, BRBPR, menorrhagia)  -likely chronic    Plan:  -trend hb  -maintain T+S  -transfuse for goal Hb>7  -f/u Fe studies    ADDENDUM repeat cbc shows milder anemia, Hb 11.4, first sample likely dilutional ; trend hb

## 2020-08-19 NOTE — H&P ADULT - PROBLEM SELECTOR PLAN 3
likely CAP as pt has been home with no recent hospitalizations  -Pt presenting w sepsis (Tmax 100.8, , RR 20), AMS, productive cough and LLL opacity on CXR c/w PNA. Received 1 dose of Vanc/Zosyn in ED, 3.7L NS. Currently able to protect airway.   -procalcitonin: 3.74    Plan:  -f/u sputum culture  -f/u blood cultures  -treat as CAP with Ceftriaxone and Azithromycin, and adjust as needed per culture sensitivities/specificities  -supplemental O2 as needed  -robitussin PRN for cough likely CAP as pt has been home with no recent hospitalizations  -Pt presenting w sepsis (Tmax 100.8, , RR 20), AMS, productive cough and LLL opacity on CXR c/w PNA. Received 1 dose of Vanc/Zosyn in ED, 3.7L NS. Currently able to protect airway.   -procalcitonin: 3.74    Plan:  -f/u sputum culture  -f/u blood cultures  -treat as CAP with Ceftriaxone and Azithromycin, and adjust as needed per culture sensitivities/specificities  -supplemental O2 as needed  -robitussin and tessalon perles PRN for cough  -f/u AM CXR

## 2020-08-19 NOTE — ED PROVIDER NOTE - DIAGNOSTIC INTERPRETATION
ER Physician: Martha Vergara   CHEST XRAY INTERPRETATION: LLL pna, heart shadow normal, bony structures intact

## 2020-08-19 NOTE — CONSULT NOTE ADULT - ATTENDING COMMENTS
agree with above note.  reviewed with housestaff
Patient seen and examined with house-staff during bedside rounds.  Resident note read, including vitals, physical findings, laboratory data, and radiological reports.   Revisions included below.  Direct personal management at bed side and extensive interpretation of the data.  Plan was outlined and discussed in details with the housestaff.  Decision making of high complexity  Action taken for acute disease activity to reflect the level of care provided:  - medication reconciliation  - review laboratory data  plan outlined.  On antibiotics Hemodynamically stable.  Please call if there is any change in clincial status

## 2020-08-19 NOTE — H&P ADULT - PROBLEM SELECTOR PLAN 4
metabolic encephalopathy 2/2 sepsis 2/2 PNA  -Pt was found down for ~3d, with no known/endorsed fall/trauma, cardiac or neurological pathology, no electrolyte abnormalities, no evidence of toxic ingestion.   (Initial BMP showing hypokalemia which would cause muscle weakness and could potentially lead to a fall, however, most likely lab error, as repeat BMP serum potassium wnl)  -management of sepsis 2/2 CAP as below metabolic encephalopathy 2/2 sepsis 2/2 PNA  -Pt was found down for ~3d, with no known/endorsed fall/trauma, cardiac or neurological pathology, no electrolyte abnormalities, no evidence of toxic ingestion.   (Initial BMP showing hypokalemia which would cause muscle weakness and could potentially lead to a fall, however, most likely lab error, as repeat BMP serum potassium wnl)  -management of sepsis 2/2 CAP as below    ADDENDUM:   #hyperglycemia, will start ISS, monitor fingerstick , repeat A1c in setting of possible lab error on first set of lab studies

## 2020-08-19 NOTE — H&P ADULT - PROBLEM SELECTOR PLAN 7
rhabdomyolysis vs cardiac release  -trop: .06  -UA positive for large blood, but only 5-10 RBCs  -s/p 3.7L NS    Plan:  -trend CK  -cautious with fluids given overload status

## 2020-08-19 NOTE — H&P ADULT - PROBLEM SELECTOR PLAN 2
Pt not on oxygen at home, now requiring 2L NC. Likely 2/2 PNA vs fluid overload status.  -pt denies hx of CHF, but cardiomegaly with pulmonary congestion on CXR and bibasilar crackles on exam    Plan:  -tx PNA as discussed below  -f/u AM TTE  -give one dose IV lasix 20mg  -cont duonebs  -O2 PRN for target O2 sat >94% Pt not on oxygen at home, now requiring 2L NC. Likely 2/2 PNA vs fluid overload status.  -pt denies hx of CHF, but cardiomegaly with pulmonary congestion on CXR and bibasilar crackles on exam    Plan:  -tx PNA as discussed below  -f/u AM TTE  -give one dose IV lasix 20mg  -cont duonebs  -O2 PRN for target O2 sat >94%  -strict Is+Os

## 2020-08-19 NOTE — ED PROVIDER NOTE - OBJECTIVE STATEMENT
65F who denies PMHX, lives alone, who was BIBEMS for AMS. Per EMS, someone in her building noticed she had a [employer] delivery outside her door from Sunday (3 days ago) so they did a well check and found her down on the ground. Pt states "I guess I must have passed out." She is c/o dehydration and low back pain as well and feeling generally weak, no focal n/t/w in extremities, denies cp/sob, n/v/abd pain. Denies ETOh or drug use. History limited due to AMS and confusion.

## 2020-08-19 NOTE — ED ADULT NURSE NOTE - BREATH SOUNDS RUL
Patient placed on monitor x 3; alarms on
Pt has ambulated to the bathroom twice with a steady gait. Alert and oriented x 4. Vital signs stable.
TIME OUT done with 
coarse

## 2020-08-19 NOTE — H&P ADULT - PROBLEM SELECTOR PLAN 6
-initial labs showing troponin 0.01, however on repeat, troponin 0.06, bnp 464  -could be demand ischemia, trend to peak  -pt with no known history of HF, however sounded fluid overloaded on exam after receiving 1L NS

## 2020-08-19 NOTE — ED ADULT NURSE NOTE - OBJECTIVE STATEMENT
Patient BIBEMS from home. As per EMS, patient fell on Sunday, has been on floor since. Patient received AOX1, +skin tears +abrasions noted to posterior lower back. No obvious trauma or deformity noted. Patient received febrile, +cough non productive, +coarse lung sounds on auscultation.

## 2020-08-19 NOTE — H&P ADULT - PROBLEM SELECTOR PLAN 9
AST/ALT: 124/61  -possibly due to sepsis  -pt denies alcohol use    Plan:  -trend LFTs AST/ALT: 124/61  -possibly due to sepsis  -pt denies alcohol use    Plan:  -trend LFTs    #Incidental chord compression  -not currently c/o weakness or sensory deficits  Plan:  -f/u spinal service recs AST/ALT: 124/61  -possibly due to sepsis  -pt denies alcohol use    Plan:  -trend LFTs  -f/u hep panel    #Incidental chord compression  -not currently c/o weakness or sensory deficits  Plan:  -f/u spinal service recs

## 2020-08-19 NOTE — ED ADULT NURSE REASSESSMENT NOTE - NS ED NURSE REASSESS COMMENT FT1
As per MD Vergara, patient does not require continuous cardiac monitoring or RN escort for CT. Patient transported via stretcher. All safety maintained. VSS. Will continue to monitor.

## 2020-08-19 NOTE — H&P ADULT - ATTENDING COMMENTS
patient seen and examined overnight     reviewed pertinent data , h&p    obese, lethargic on exam, waxing and waning mental status; perrla, no photophobia, tongue appears dry but pt w/ audible lung congestion; 92% on 2LPM, increased to 93% on 4LPM; oriented to person, place ("hospital") and month ; s1s2, +course crackles b/l, w/ expiratory wheezing. abdomen soft /nt/nd; no lower ext edema b/l; pt moves all extremities , no gross focal CN motor deficits noted; +olmos, draining dark cloudy urine    1. sepsis 2/2 PNA, w/ hypoxia; concern for fluid overload; given IV lasix 20mg x 1 , ordered for IV lasix 20mg again overnight, followup repeat CXR; c/w cef/azithro; will obtain ABG; followup blood ctxs, monitor urine output. agree w/ ECHO.    2. trend CK, LFTs, holding off on further fluids o/n in setting of fluid overload.         plan as above, with noted addenda

## 2020-08-20 LAB
A1C WITH ESTIMATED AVERAGE GLUCOSE RESULT: 6.2 % — HIGH (ref 4–5.6)
ALBUMIN SERPL ELPH-MCNC: 2.3 G/DL — LOW (ref 3.3–5)
ALP SERPL-CCNC: 63 U/L — SIGNIFICANT CHANGE UP (ref 40–120)
ALT FLD-CCNC: 77 U/L — HIGH (ref 10–45)
ANION GAP SERPL CALC-SCNC: 14 MMOL/L — SIGNIFICANT CHANGE UP (ref 5–17)
AST SERPL-CCNC: 195 U/L — HIGH (ref 10–40)
BILIRUB SERPL-MCNC: 0.5 MG/DL — SIGNIFICANT CHANGE UP (ref 0.2–1.2)
BLD GP AB SCN SERPL QL: NEGATIVE — SIGNIFICANT CHANGE UP
BUN SERPL-MCNC: 63 MG/DL — HIGH (ref 7–23)
CALCIUM SERPL-MCNC: 8.8 MG/DL — SIGNIFICANT CHANGE UP (ref 8.4–10.5)
CHLORIDE SERPL-SCNC: 102 MMOL/L — SIGNIFICANT CHANGE UP (ref 96–108)
CK SERPL-CCNC: 8445 U/L — HIGH (ref 25–170)
CO2 SERPL-SCNC: 23 MMOL/L — SIGNIFICANT CHANGE UP (ref 22–31)
CREAT SERPL-MCNC: 2.15 MG/DL — HIGH (ref 0.5–1.3)
CULTURE RESULTS: NO GROWTH — SIGNIFICANT CHANGE UP
ESTIMATED AVERAGE GLUCOSE: 131 MG/DL — HIGH (ref 68–114)
GAS PNL BLDA: SIGNIFICANT CHANGE UP
GLUCOSE BLDC GLUCOMTR-MCNC: 118 MG/DL — HIGH (ref 70–99)
GLUCOSE BLDC GLUCOMTR-MCNC: 119 MG/DL — HIGH (ref 70–99)
GLUCOSE BLDC GLUCOMTR-MCNC: 171 MG/DL — HIGH (ref 70–99)
GLUCOSE BLDC GLUCOMTR-MCNC: 97 MG/DL — SIGNIFICANT CHANGE UP (ref 70–99)
GLUCOSE SERPL-MCNC: 125 MG/DL — HIGH (ref 70–99)
HAV IGM SER-ACNC: SIGNIFICANT CHANGE UP
HBV CORE IGM SER-ACNC: SIGNIFICANT CHANGE UP
HBV SURFACE AG SER-ACNC: SIGNIFICANT CHANGE UP
HCT VFR BLD CALC: 36.6 % — SIGNIFICANT CHANGE UP (ref 34.5–45)
HCV AB S/CO SERPL IA: 0.06 S/CO — SIGNIFICANT CHANGE UP
HCV AB SERPL-IMP: SIGNIFICANT CHANGE UP
HGB BLD-MCNC: 12.8 G/DL — SIGNIFICANT CHANGE UP (ref 11.5–15.5)
MAGNESIUM SERPL-MCNC: 2.8 MG/DL — HIGH (ref 1.6–2.6)
MCHC RBC-ENTMCNC: 32.4 PG — SIGNIFICANT CHANGE UP (ref 27–34)
MCHC RBC-ENTMCNC: 35 GM/DL — SIGNIFICANT CHANGE UP (ref 32–36)
MCV RBC AUTO: 92.7 FL — SIGNIFICANT CHANGE UP (ref 80–100)
NRBC # BLD: 0 /100 WBCS — SIGNIFICANT CHANGE UP (ref 0–0)
PHOSPHATE SERPL-MCNC: 3.2 MG/DL — SIGNIFICANT CHANGE UP (ref 2.5–4.5)
PLATELET # BLD AUTO: 266 K/UL — SIGNIFICANT CHANGE UP (ref 150–400)
POTASSIUM SERPL-MCNC: 4.6 MMOL/L — SIGNIFICANT CHANGE UP (ref 3.5–5.3)
POTASSIUM SERPL-SCNC: 4.6 MMOL/L — SIGNIFICANT CHANGE UP (ref 3.5–5.3)
PROT SERPL-MCNC: 7.5 G/DL — SIGNIFICANT CHANGE UP (ref 6–8.3)
RBC # BLD: 3.95 M/UL — SIGNIFICANT CHANGE UP (ref 3.8–5.2)
RBC # FLD: 15 % — HIGH (ref 10.3–14.5)
RH IG SCN BLD-IMP: POSITIVE — SIGNIFICANT CHANGE UP
SODIUM SERPL-SCNC: 139 MMOL/L — SIGNIFICANT CHANGE UP (ref 135–145)
SODIUM UR-SCNC: 20 MMOL/L — SIGNIFICANT CHANGE UP
SPECIMEN SOURCE: SIGNIFICANT CHANGE UP
TSH SERPL-MCNC: 0.32 UIU/ML — LOW (ref 0.35–4.94)
UUN UR-MCNC: 909 MG/DL — SIGNIFICANT CHANGE UP
WBC # BLD: 12.01 K/UL — HIGH (ref 3.8–10.5)
WBC # FLD AUTO: 12.01 K/UL — HIGH (ref 3.8–10.5)

## 2020-08-20 PROCEDURE — 93306 TTE W/DOPPLER COMPLETE: CPT | Mod: 26

## 2020-08-20 PROCEDURE — 71045 X-RAY EXAM CHEST 1 VIEW: CPT | Mod: 26

## 2020-08-20 PROCEDURE — 99233 SBSQ HOSP IP/OBS HIGH 50: CPT | Mod: GC

## 2020-08-20 PROCEDURE — 36000 PLACE NEEDLE IN VEIN: CPT

## 2020-08-20 PROCEDURE — 76937 US GUIDE VASCULAR ACCESS: CPT | Mod: 26

## 2020-08-20 RX ORDER — DEXTROSE 50 % IN WATER 50 %
25 SYRINGE (ML) INTRAVENOUS ONCE
Refills: 0 | Status: DISCONTINUED | OUTPATIENT
Start: 2020-08-20 | End: 2020-08-26

## 2020-08-20 RX ORDER — FUROSEMIDE 40 MG
20 TABLET ORAL ONCE
Refills: 0 | Status: COMPLETED | OUTPATIENT
Start: 2020-08-20 | End: 2020-08-20

## 2020-08-20 RX ORDER — POLYETHYLENE GLYCOL 3350 17 G/17G
17 POWDER, FOR SOLUTION ORAL ONCE
Refills: 0 | Status: COMPLETED | OUTPATIENT
Start: 2020-08-20 | End: 2020-08-20

## 2020-08-20 RX ORDER — DEXTROSE 50 % IN WATER 50 %
12.5 SYRINGE (ML) INTRAVENOUS ONCE
Refills: 0 | Status: DISCONTINUED | OUTPATIENT
Start: 2020-08-20 | End: 2020-08-26

## 2020-08-20 RX ORDER — GLUCAGON INJECTION, SOLUTION 0.5 MG/.1ML
1 INJECTION, SOLUTION SUBCUTANEOUS ONCE
Refills: 0 | Status: DISCONTINUED | OUTPATIENT
Start: 2020-08-20 | End: 2020-08-26

## 2020-08-20 RX ORDER — INSULIN LISPRO 100/ML
VIAL (ML) SUBCUTANEOUS
Refills: 0 | Status: DISCONTINUED | OUTPATIENT
Start: 2020-08-20 | End: 2020-08-26

## 2020-08-20 RX ORDER — ACETAMINOPHEN 500 MG
650 TABLET ORAL ONCE
Refills: 0 | Status: DISCONTINUED | OUTPATIENT
Start: 2020-08-20 | End: 2020-08-26

## 2020-08-20 RX ORDER — ENOXAPARIN SODIUM 100 MG/ML
30 INJECTION SUBCUTANEOUS EVERY 24 HOURS
Refills: 0 | Status: DISCONTINUED | OUTPATIENT
Start: 2020-08-20 | End: 2020-08-26

## 2020-08-20 RX ORDER — POTASSIUM CHLORIDE 20 MEQ
40 PACKET (EA) ORAL ONCE
Refills: 0 | Status: DISCONTINUED | OUTPATIENT
Start: 2020-08-20 | End: 2020-08-20

## 2020-08-20 RX ORDER — INSULIN LISPRO 100/ML
VIAL (ML) SUBCUTANEOUS AT BEDTIME
Refills: 0 | Status: DISCONTINUED | OUTPATIENT
Start: 2020-08-20 | End: 2020-08-26

## 2020-08-20 RX ORDER — SODIUM CHLORIDE 9 MG/ML
1000 INJECTION, SOLUTION INTRAVENOUS
Refills: 0 | Status: DISCONTINUED | OUTPATIENT
Start: 2020-08-20 | End: 2020-08-26

## 2020-08-20 RX ORDER — DEXTROSE 50 % IN WATER 50 %
15 SYRINGE (ML) INTRAVENOUS ONCE
Refills: 0 | Status: DISCONTINUED | OUTPATIENT
Start: 2020-08-20 | End: 2020-08-26

## 2020-08-20 RX ADMIN — CEFTRIAXONE 100 MILLIGRAM(S): 500 INJECTION, POWDER, FOR SOLUTION INTRAMUSCULAR; INTRAVENOUS at 21:53

## 2020-08-20 RX ADMIN — Medication 650 MILLIGRAM(S): at 21:35

## 2020-08-20 RX ADMIN — Medication 20 MILLIGRAM(S): at 02:13

## 2020-08-20 RX ADMIN — AZITHROMYCIN 255 MILLIGRAM(S): 500 TABLET, FILM COATED ORAL at 18:55

## 2020-08-20 RX ADMIN — Medication 0: at 22:20

## 2020-08-20 RX ADMIN — ENOXAPARIN SODIUM 30 MILLIGRAM(S): 100 INJECTION SUBCUTANEOUS at 12:40

## 2020-08-20 RX ADMIN — Medication 650 MILLIGRAM(S): at 21:05

## 2020-08-20 RX ADMIN — POLYETHYLENE GLYCOL 3350 17 GRAM(S): 17 POWDER, FOR SOLUTION ORAL at 21:08

## 2020-08-20 NOTE — DIETITIAN INITIAL EVALUATION ADULT. - PROBLEM SELECTOR PLAN 3
likely CAP as pt has been home with no recent hospitalizations  -Pt presenting w sepsis (Tmax 100.8, , RR 20), AMS, productive cough and LLL opacity on CXR c/w PNA. Received 1 dose of Vanc/Zosyn in ED, 3.7L NS. Currently able to protect airway.   -procalcitonin: 3.74    Plan:  -f/u sputum culture  -f/u blood cultures  -treat as CAP with Ceftriaxone and Azithromycin, and adjust as needed per culture sensitivities/specificities  -supplemental O2 as needed  -robitussin and tessalon perles PRN for cough  -f/u AM CXR

## 2020-08-20 NOTE — DIETITIAN INITIAL EVALUATION ADULT. - PROBLEM SELECTOR PLAN 8
Hb 7.8  -no signs of active bleed (melena, BRBPR, menorrhagia)  -likely chronic    Plan:  -trend hb  -maintain T+S  -transfuse for goal Hb>7  -f/u Fe studies    ADDENDUM repeat cbc shows milder anemia, Hb 11.4, first sample likely dilutional ; trend hb

## 2020-08-20 NOTE — SWALLOW BEDSIDE ASSESSMENT ADULT - NS SPL SWALLOW CLINIC TRIAL FT
Pt with slightly increased WOB with PO trials, especially following mastication, indicating reduced coordination between deglutition and respiration. She benefited from rest breaks in between trials to compensate for respiratory status. Laryngeal movement palpated. Pt with rare episodes of coughing throughout the evaluation. However, as mentioned above, pt with baseline cough. Coughing did not appear wet in nature/related to aspiration.

## 2020-08-20 NOTE — DIETITIAN INITIAL EVALUATION ADULT. - ENERGY NEEDS
Ht:5ft 6inches)estimated. IBW:130lbs +/-10%,138% of IBW.BMI:29.Fluids per team due to concern for fluid overload.Moderate protein due to noted renal parameters, but has increased needs due to stage 2 PU

## 2020-08-20 NOTE — PROGRESS NOTE ADULT - PROBLEM SELECTOR PLAN 3
Pt not on oxygen at home, now requiring 2L NC. Likely 2/2 PNA vs fluid overload status.  -pt denies hx of CHF, but cardiomegaly with pulmonary congestion on CXR and bibasilar crackles on exam    Plan:  -tx PNA as discussed below  -f/u AM TTE  -give one dose IV lasix 20mg  -cont duonebs  -O2 PRN for target O2 sat >94%  -strict Is+Os Cr 2.15 with unknown baseline. Unknown whether CKD or HERI.  -f/u urine lytes  -trend Cr

## 2020-08-20 NOTE — PROGRESS NOTE ADULT - PROBLEM SELECTOR PLAN 9
AST/ALT: 124/61  -possibly due to sepsis  -pt denies alcohol use    Plan:  -trend LFTs  -f/u hep panel    #Incidental chord compression  -not currently c/o weakness or sensory deficits  Plan:  -f/u spinal service recs AST/ALT: 195/77  -possibly due to sepsis (neg drug tox and acetaminophen levels)  -pt denies alcohol use    Plan:  -trend LFTs      #Incidental cord compression  -not currently c/o weakness or sensory deficits  Plan:  -f/u spinal service recs

## 2020-08-20 NOTE — PROGRESS NOTE ADULT - PROBLEM SELECTOR PROBLEM 2
Sepsis, due to unspecified organism, unspecified whether acute organ dysfunction present Acute respiratory failure with hypoxia

## 2020-08-20 NOTE — PROGRESS NOTE ADULT - PROBLEM SELECTOR PLAN 1
likely CAP as pt has been home with no recent hospitalizations  -Pt presenting w sepsis (Tmax 100.8, , RR 20), AMS, productive cough and LLL opacity on CXR c/w PNA. Received 1 dose of Vanc/Zosyn in ED, 3.7L NS. Now on ceftriaxone and azithromycin for suspected CAP.    Plan:  -continue azithromycin and ceftriaxone course per pending blood cxs  -nebulizer added   -f/u sputum culture  -f/u blood cultures  -supplemental O2 as needed  -robitussin and tessalon perles PRN for cough  -f/u AM CXR

## 2020-08-20 NOTE — PROGRESS NOTE ADULT - PROBLEM SELECTOR PLAN 2
-SIRS criteria met: Tmax 100.8, , RR 20)  -likely PNA given LLL opacity on CXR with new onset productive cough and fevers  -UA negative  -s/p 3.7L NS, vanc, zosyn in ED    Plan:  -tx CAP as discussed below  -cautious with fluids given possible overload status Pt not on oxygen at home, now requiring 2L NC (stating 94% on 2L NC, 88% on 1L). Likely 2/2 PNA vs fluid overload status.  -pt denies hx of CHF, but cardiomegaly with pulmonary congestion on CXR and bibasilar crackles on exam    Plan:  -tx PNA as discussed above  -f/u AM TTE  -cont duonebs  -target O2 sat >94%  -strict Is+Os

## 2020-08-20 NOTE — PROCEDURE NOTE - NSICDXPROCEDURE_GEN_ALL_CORE_FT
PROCEDURES:  Ultrasound guided venous access 20-Aug-2020 20:28:51  Alfa Hurst  Insertion, catheter, intravenous 20-Aug-2020 20:28:44  Alfa Hurst

## 2020-08-20 NOTE — PHYSICAL THERAPY INITIAL EVALUATION ADULT - MANUAL MUSCLE TESTING RESULTS, REHAB EVAL
Both UE at least 3/5. Both hip flexion at least 3/5. Both knee ext, ankle DF/PF 5/5. Exam limited due to patient being somewhat lethargic

## 2020-08-20 NOTE — PROGRESS NOTE ADULT - PROBLEM SELECTOR PLAN 10
F: caution with fluids given overload status  E: replete PRN  N: pending bedside dysphagia screen  DVT ppx: SCDs hold off chemical due to anemia of unknown cause  GI ppx: not indicated    Code Status: Full Code  Dispo: [id number]

## 2020-08-20 NOTE — PROGRESS NOTE ADULT - SUBJECTIVE AND OBJECTIVE BOX
Patient was seen and examined by me at bedside. I agree with resident's note, subjective, objective physical exam, assessment and plan with following modifications/additions.    Greater than 35 minutes spent on total encounter; more than 50% of the visit was spent counseling and/or coordinating care by the attending physician.    S: Reports productive cough, breathing stable, otherwise no other complaints    O: VSS-afeb now, HD stable, high 80's on room air needing 1-2L, Aox3 awake, lungs clear anteriorly, CV RRR, abd soft, nt ext wwp no sig le edema. Bedside POCUS lung- no sig effusions noted, infiltrate pattern noted on L side. labs/imaging reviewed- WBC12, creat stable in 2's now (unclear baseline initial labs with creat wnl lab errors), mild transaminitis, rising ck. cx data-NGTD    A/P: 65F who reports no significant PMHx, brought in after being found down at home for ~3days, complaining of confusion, cough and back pain, admitted for sepsis 2/2 LLL pneumonia likely now c/b ATN  -LLB PNA- conitinue ceftriaxone/azithro for now, f/u bcx, f/u atypical workup, low threshold for CT and pulm involvement if worsening resp status (on 2L now, WOB stable), hold off further diuresis/fluids. if remains somnolent regas   -HERI not improved s/p fluids likely slightly overloaded, unclear baseline cr, likely ATN, also CK high- send urine studies, bladder scan, ua unremarkable, trend creat and dose adjust meds accordingly. Re CK now upto 8k, trend for now but if further uptrend likely restart fluids  -Mild overload s/p fluids, check TTE   -ISS for DM  -Diet- ensure with nursing no aspiration risk, otherwise can continue DM diet  -DVT px- lovenox  -Dispo- will need PT eval, remains medically active    Shahbaz Haile MD [phone] Patient was seen and examined by me at bedside. I agree with resident's note, subjective, objective physical exam, assessment and plan with following modifications/additions.    Greater than 35 minutes spent on total encounter; more than 50% of the visit was spent counseling and/or coordinating care by the attending physician.    S: Reports productive cough, breathing stable, otherwise no other complaints    O: VSS-afeb now, HD stable, high 80's on room air needing 1-2L, Aox3 awake, lungs clear anteriorly, CV RRR, abd soft, nt ext wwp no sig le edema. Bedside POCUS lung- no sig effusions noted, infiltrate pattern noted on L side. labs/imaging reviewed- WBC12, creat stable in 2's now (unclear baseline initial labs with creat wnl lab errors), mild transaminitis, rising ck. cx data-NGTD    A/P: 65F who reports no significant PMHx, brought in after being found down at home for ~3days, complaining of confusion, cough and back pain, admitted for sepsis 2/2 LLL pneumonia likely now c/b ATN  -LLB PNA- conitinue ceftriaxone/azithro for now, f/u bcx, f/u atypical workup, low threshold for CT and pulm involvement if worsening resp status (on 2L now, WOB stable), hold off further diuresis/fluids. if remains somnolent regas   -HERI not improved s/p fluids likely slightly overloaded, unclear baseline cr, likely ATN, also CK high- send urine studies, bladder scan, ua unremarkable, trend creat and dose adjust meds accordingly. Re CK now upto 8k, trend for now but if further uptrend likely restart fluids  -Mild overload s/p fluids, check TTE   -ISS for DM  -Diet- ensure with nursing no aspiration risk, otherwise can continue DM diet  -DVT px- SQH while with HERI  -Dispo- will need PT eval, remains medically active    Shahbaz Haile MD [phone]

## 2020-08-20 NOTE — SWALLOW BEDSIDE ASSESSMENT ADULT - SWALLOW EVAL: DIAGNOSIS
Clinical signs of pharyngeal dysphagia likely acute and related to increased respiratory requirements Suspect swallow-breath pattern will improve as respiratory status improves. Recommend a mechanical soft solid diet with thin liquids. Encourage pt to take frequent rest breaks during meals to optimize respiration.

## 2020-08-20 NOTE — PROCEDURE NOTE - NSPROCDETAILS_GEN_ALL_CORE
dressing applied/secured in place/location identified, draped/prepped, sterile technique used/sterile technique, catheter placed/ultrasound utilization/flushes easily/blood seen on insertion

## 2020-08-20 NOTE — DIETITIAN INITIAL EVALUATION ADULT. - OTHER INFO
66y/o female admitted after being found on floor by neighbor.Noted Sepsis and acute respiratory failure with hypoxia with pneumonia.Patient very lethargic during RD visit.Per patient, stated "I like water" RD unable to complete nutritionally assessment at this time.No N/V/D/C or pain noted.SKin with noted Sacrum stage 2.Per note,patient resides alone and was receiving "[employer]" food delivery.Per estimated height of 5ft 6inches, patient is 138% of IBW.Follow up with SLP intervention for diet advancement

## 2020-08-20 NOTE — PHYSICAL THERAPY INITIAL EVALUATION ADULT - GENERAL OBSERVATIONS, REHAB EVAL
As per Keily SANTIZO patient cleared for PT/OOB. received supine + oxygen 1.5 LNC, kayode olmos, in NAD

## 2020-08-20 NOTE — SWALLOW BEDSIDE ASSESSMENT ADULT - SLP GENERAL OBSERVATIONS
Received awake, sitting upright in bed, A&O, with a non-productive cough at baseline, receiving 2L of supplemental O2 via NCL.

## 2020-08-20 NOTE — PROGRESS NOTE ADULT - PROBLEM SELECTOR PLAN 8
Hb 7.8  -no signs of active bleed (melena, BRBPR, menorrhagia)  -likely chronic    Plan:  -trend hb  -maintain T+S  -transfuse for goal Hb>7  -f/u Fe studies    ADDENDUM repeat cbc shows milder anemia, Hb 11.4, first sample likely dilutional ; trend hb Hb 12.8>11.4; first sample likely dilutional (7.8)  -no signs of active bleed (melena, BRBPR, menorrhagia)  -likely chronic    Plan:  -trend hb  -maintain T+S  -transfuse for goal Hb>7  -f/u Fe studies

## 2020-08-20 NOTE — DIETITIAN INITIAL EVALUATION ADULT. - PROBLEM SELECTOR PLAN 2
Pt not on oxygen at home, now requiring 2L NC. Likely 2/2 PNA vs fluid overload status.  -pt denies hx of CHF, but cardiomegaly with pulmonary congestion on CXR and bibasilar crackles on exam    Plan:  -tx PNA as discussed below  -f/u AM TTE  -give one dose IV lasix 20mg  -cont duonebs  -O2 PRN for target O2 sat >94%  -strict Is+Os

## 2020-08-20 NOTE — PHYSICAL THERAPY INITIAL EVALUATION ADULT - PERTINENT HX OF CURRENT PROBLEM, REHAB EVAL
65F who denies PMHX, lives alone, BIBEMS for AMS. Per EMS, someone in her building noticed she had a [employer] delivery outside her door from Sunday (3 days ago) so they checked in on her and found her down on the ground. Pt has no recollection of recent events leading up to her hospitalization. She states "I guess I got dehydrated and passed out."  CTH neg for acute injury

## 2020-08-20 NOTE — PROGRESS NOTE ADULT - PROBLEM SELECTOR PLAN 4
metabolic encephalopathy 2/2 sepsis 2/2 PNA  -Pt was found down for ~3d, with no known/endorsed fall/trauma, cardiac or neurological pathology, no electrolyte abnormalities, no evidence of toxic ingestion.   (Initial BMP showing hypokalemia which would cause muscle weakness and could potentially lead to a fall, however, most likely lab error, as repeat BMP serum potassium wnl)  -management of sepsis 2/2 CAP as below    ADDENDUM:   #hyperglycemia, will start ISS, monitor fingerstick , repeat A1c in setting of possible lab error on first set of lab studies rhabdomyolysis vs cardiac release  -CK uptrending 8445>4072>1664  -trop: .06  -UA positive for large blood, but only 5-10 RBCs  -s/p 3.7L NS    Plan:  -trend CK  -cautious with fluids given overload status

## 2020-08-20 NOTE — PHYSICAL THERAPY INITIAL EVALUATION ADULT - CRITERIA FOR SKILLED THERAPEUTIC INTERVENTIONS
anticipated discharge recommendation/therapy frequency/rehab potential/impairments found/functional limitations in following categories

## 2020-08-20 NOTE — PROGRESS NOTE ADULT - SUBJECTIVE AND OBJECTIVE BOX
Subjective/ONE:  Patient had fever (100.4F) overnight, now afebrile. She reports productive cough, but much improvement in breathing.    MEDICATIONS  (STANDING):  azithromycin  IVPB 500 milliGRAM(s) IV Intermittent every 24 hours  cefTRIAXone   IVPB 2000 milliGRAM(s) IV Intermittent every 24 hours  dextrose 5%. 1000 milliLiter(s) (50 mL/Hr) IV Continuous <Continuous>  dextrose 50% Injectable 12.5 Gram(s) IV Push once  dextrose 50% Injectable 25 Gram(s) IV Push once  dextrose 50% Injectable 25 Gram(s) IV Push once  insulin lispro (HumaLOG) corrective regimen sliding scale   SubCutaneous three times a day before meals  insulin lispro (HumaLOG) corrective regimen sliding scale   SubCutaneous at bedtime    MEDICATIONS  (PRN):  acetaminophen   Tablet .. 650 milliGRAM(s) Oral every 4 hours PRN Mild Pain (1 - 3)  benzonatate 100 milliGRAM(s) Oral three times a day PRN Cough  dextrose 40% Gel 15 Gram(s) Oral once PRN Blood Glucose LESS THAN 70 milliGRAM(s)/deciliter  glucagon  Injectable 1 milliGRAM(s) IntraMuscular once PRN Glucose LESS THAN 70 milligrams/deciliter  guaifenesin/dextromethorphan  Syrup 10 milliLiter(s) Oral three times a day PRN Cough    Allergies  No Known Allergies    Vital Signs Last 24 Hrs  T(C): 36.5 (20 Aug 2020 06:43), Max: 38.8 (19 Aug 2020 11:11)  T(F): 97.7 (20 Aug 2020 06:43), Max: 101.8 (19 Aug 2020 11:11)  HR: 87 (20 Aug 2020 06:43) (81 - 118)  BP: 111/75 (20 Aug 2020 06:43) (110/57 - 131/101)  BP(mean): --  ABP: --  ABP(mean): --  RR: 19 (20 Aug 2020 06:43) (18 - 22)  SpO2: 97% (20 Aug 2020 06:43) (94% - 100%)      PHYSICAL EXAM:  GENERAL: Obese woman, productive cough while speaking; on room air  HEAD:  Atraumatic, Normocephalic  EYES: Conjunctiva and sclera clear  HEART: Regular rate and rhythm; No murmurs, rubs, or gallops  RESPIRATORY: Crackles in b/l posterior lung fields  ABDOMEN: +BS; Soft, Nontender, Nondistended  NEUROLOGY: A&Ox3, nonfocal, moving all extremities  EXTREMITIES:  2+ Peripheral Pulses, No clubbing, cyanosis, or edema  SKIN: warm, dry, normal color; +sacral decubitus pressure ulcer with dressing      LABS:  08-19 @ 13:49 Creatine 4072 U/L<H> [25 - 170]  08-19 @ 11:46 Creatine 1664 U/L<H> [25 - 170]  cret                        11.4   10.06 )-----------( 251      ( 19 Aug 2020 23:01 )             34.5     08-19    138  |  100  |  58<H>  ----------------------------<  302<H>  3.6   |  22  |  2.00<H>    Ca    8.3<L>      19 Aug 2020 23:12  Mg     2.7     08-19    TPro  6.6  /  Alb  2.6<L>  /  TBili  0.6  /  DBili  x   /  AST  156<H>  /  ALT  67<H>  /  AlkPhos  53  08-19    PT/INR - ( 19 Aug 2020 11:46 )   PT: 15.3 sec;   INR: 1.29          PTT - ( 19 Aug 2020 11:46 )  PTT:30.0 sec Subjective/ONE:  Patient had fever (100.4F) overnight, now afebrile. She reports productive cough. She appeared alert at bedside at 7am, then more lethargic when returned with team at 10am. She denies chest pain, n/v, abdominal pain.    MEDICATIONS  (STANDING):  azithromycin  IVPB 500 milliGRAM(s) IV Intermittent every 24 hours  cefTRIAXone   IVPB 2000 milliGRAM(s) IV Intermittent every 24 hours  dextrose 5%. 1000 milliLiter(s) (50 mL/Hr) IV Continuous <Continuous>  dextrose 50% Injectable 12.5 Gram(s) IV Push once  dextrose 50% Injectable 25 Gram(s) IV Push once  dextrose 50% Injectable 25 Gram(s) IV Push once  insulin lispro (HumaLOG) corrective regimen sliding scale   SubCutaneous three times a day before meals  insulin lispro (HumaLOG) corrective regimen sliding scale   SubCutaneous at bedtime    MEDICATIONS  (PRN):  acetaminophen   Tablet .. 650 milliGRAM(s) Oral every 4 hours PRN Mild Pain (1 - 3)  benzonatate 100 milliGRAM(s) Oral three times a day PRN Cough  dextrose 40% Gel 15 Gram(s) Oral once PRN Blood Glucose LESS THAN 70 milliGRAM(s)/deciliter  glucagon  Injectable 1 milliGRAM(s) IntraMuscular once PRN Glucose LESS THAN 70 milligrams/deciliter  guaifenesin/dextromethorphan  Syrup 10 milliLiter(s) Oral three times a day PRN Cough    Allergies  No Known Allergies    Vital Signs Last 24 Hrs  T(C): 36.5 (20 Aug 2020 06:43), Max: 38.8 (19 Aug 2020 11:11)  T(F): 97.7 (20 Aug 2020 06:43), Max: 101.8 (19 Aug 2020 11:11)  HR: 87 (20 Aug 2020 06:43) (81 - 118)  BP: 111/75 (20 Aug 2020 06:43) (110/57 - 131/101)  BP(mean): --  ABP: --  ABP(mean): --  RR: 19 (20 Aug 2020 06:43) (18 - 22)  SpO2: 97% (20 Aug 2020 06:43) (94% - 100%)      PHYSICAL EXAM:  GENERAL: Obese woman, lethargic; speaking in full sentences with occasional pausing for productive coughing; nasal cannula on 2L  HEAD:  Atraumatic, Normocephalic  EYES: Conjunctiva and sclera clear  HEART: Regular rate and rhythm; No murmurs, rubs, or gallops  RESPIRATORY: Crackles in b/l posterior lung fields  ABDOMEN: +BS; Soft, Nontender, Nondistended  NEUROLOGY: A&Ox3, nonfocal, moving all extremities  EXTREMITIES:  2+ Peripheral Pulses, No clubbing, cyanosis, or edema  SKIN: warm, dry, normal color; +sacral decubitus pressure ulcer with dressing      LABS:  08-20 @ 08:36 Creatine 8445 U/L<H> [25 - 170]  08-19 @ 13:49 Creatine 4072 U/L<H> [25 - 170]  cret                        12.8   12.01 )-----------( 266      ( 20 Aug 2020 08:36 )             36.6     08-20    139  |  102  |  63<H>  ----------------------------<  125<H>  4.6   |  23  |  2.15<H>    Ca    8.8      20 Aug 2020 08:36  Phos  3.2     08-20  Mg     2.8     08-20    TPro  7.5  /  Alb  2.3<L>  /  TBili  0.5  /  DBili  x   /  AST  195<H>  /  ALT  77<H>  /  AlkPhos  63  08-20    PT/INR - ( 19 Aug 2020 11:46 )   PT: 15.3 sec;   INR: 1.29          PTT - ( 19 Aug 2020 11:46 )  PTT:30.0 sec

## 2020-08-20 NOTE — SWALLOW BEDSIDE ASSESSMENT ADULT - SLP PERTINENT HISTORY OF CURRENT PROBLEM
No known PMHX, BIBEMS after being found down at home for ~3days,admitted for sepsis 2/2 LLL pneumonia

## 2020-08-20 NOTE — DIETITIAN INITIAL EVALUATION ADULT. - PROBLEM SELECTOR PLAN 9
AST/ALT: 124/61  -possibly due to sepsis  -pt denies alcohol use    Plan:  -trend LFTs  -f/u hep panel    #Incidental chord compression  -not currently c/o weakness or sensory deficits  Plan:  -f/u spinal service recs

## 2020-08-20 NOTE — PROGRESS NOTE ADULT - PROBLEM SELECTOR PLAN 6
-initial labs showing troponin 0.01, however on repeat, troponin 0.06, bnp 464  -could be demand ischemia, trend to peak  -pt with no known history of HF, however sounded fluid overloaded on exam after receiving 1L NS metabolic encephalopathy 2/2 sepsis 2/2 PNA  -Pt was found down for ~3d, with no known/endorsed fall/trauma, cardiac or neurological pathology, no electrolyte abnormalities, no evidence of toxic ingestion.   (Initial BMP showing hypokalemia which would cause muscle weakness and could potentially lead to a fall, however, most likely lab error, as repeat BMP serum potassium wnl)  -management of sepsis 2/2 CAP as above    #hyperglycemia- monitor fingerstick , repeat A1c 6.2

## 2020-08-20 NOTE — PROGRESS NOTE ADULT - ASSESSMENT
65F with no significant PMHx, BIBEMS after being found down at home for ~3days, complaining of confusion, cough and back pain, admitted for sepsis 2/2 LLL pneumonia s/p fluids in ED with possible fluid overload. 65F who reports no significant PMHx, BIBEMS after being found down at home for ~3days, complaining of confusion, cough and back pain, admitted for sepsis 2/2 LLL pneumonia s/p fluids in ED with possible fluid overload.

## 2020-08-20 NOTE — PROGRESS NOTE ADULT - PROBLEM SELECTOR PLAN 7
rhabdomyolysis vs cardiac release  -trop: .06  -UA positive for large blood, but only 5-10 RBCs  -s/p 3.7L NS    Plan:  -trend CK  -cautious with fluids given overload status -initial labs showing troponin 0.01, however on repeat, troponin 0.06, bnp 464  -could be demand ischemia, trend to peak  -pt with no known history of HF, however sounded fluid overloaded on exam after receiving 1L NS

## 2020-08-20 NOTE — DIETITIAN INITIAL EVALUATION ADULT. - PROBLEM SELECTOR PLAN 10
F: caution with fluids given overload status  E: replete PRN  N: pending bedside dysphagia screen  DVT ppx: SCDs hold off chemical due to anemia of unknown cause  GI ppx: not indicated    Code Status: Full Code  Dispo: [email or web address]

## 2020-08-20 NOTE — DIETITIAN INITIAL EVALUATION ADULT. - PROBLEM SELECTOR PLAN 4
metabolic encephalopathy 2/2 sepsis 2/2 PNA  -Pt was found down for ~3d, with no known/endorsed fall/trauma, cardiac or neurological pathology, no electrolyte abnormalities, no evidence of toxic ingestion.   (Initial BMP showing hypokalemia which would cause muscle weakness and could potentially lead to a fall, however, most likely lab error, as repeat BMP serum potassium wnl)  -management of sepsis 2/2 CAP as below    ADDENDUM:   #hyperglycemia, will start ISS, monitor fingerstick , repeat A1c in setting of possible lab error on first set of lab studies

## 2020-08-20 NOTE — PROGRESS NOTE ADULT - PROBLEM SELECTOR PLAN 5
Cr 1.67 with unknown baseline. Unknown whether CKD or HERI.  -f/u urine lytes  -trend Cr Cr 2.15 with unknown baseline. Unknown whether CKD or HERI.  -f/u urine lytes  -trend Cr -SIRS criteria met: Tmax 100.8, , RR 20  -likely PNA given LLL opacity on CXR with new onset productive cough and fevers  -UA negative  -s/p 3.7L NS, vanc, zosyn in ED    Plan:  -tx CAP as discussed above  -cautious with fluids given possible overload status

## 2020-08-20 NOTE — PROGRESS NOTE ADULT - PROBLEM SELECTOR PROBLEM 5
Creatinine elevation Sepsis, due to unspecified organism, unspecified whether acute organ dysfunction present

## 2020-08-20 NOTE — SWALLOW BEDSIDE ASSESSMENT ADULT - COMMENTS
Per H&P note, team suspicious of PNA given LLL opacity on CXR with new onset productive cough and fevers

## 2020-08-21 DIAGNOSIS — R73.9 HYPERGLYCEMIA, UNSPECIFIED: ICD-10-CM

## 2020-08-21 LAB
ALBUMIN SERPL ELPH-MCNC: 2.6 G/DL — LOW (ref 3.3–5)
ALP SERPL-CCNC: 67 U/L — SIGNIFICANT CHANGE UP (ref 40–120)
ALT FLD-CCNC: 101 U/L — HIGH (ref 10–45)
ANION GAP SERPL CALC-SCNC: 11 MMOL/L — SIGNIFICANT CHANGE UP (ref 5–17)
ANION GAP SERPL CALC-SCNC: 13 MMOL/L — SIGNIFICANT CHANGE UP (ref 5–17)
AST SERPL-CCNC: 309 U/L — HIGH (ref 10–40)
BASOPHILS # BLD AUTO: 0.11 K/UL — SIGNIFICANT CHANGE UP (ref 0–0.2)
BASOPHILS NFR BLD AUTO: 0.9 % — SIGNIFICANT CHANGE UP (ref 0–2)
BILIRUB SERPL-MCNC: 0.4 MG/DL — SIGNIFICANT CHANGE UP (ref 0.2–1.2)
BUN SERPL-MCNC: 52 MG/DL — HIGH (ref 7–23)
BUN SERPL-MCNC: 64 MG/DL — HIGH (ref 7–23)
CALCIUM SERPL-MCNC: 8 MG/DL — LOW (ref 8.4–10.5)
CALCIUM SERPL-MCNC: 9 MG/DL — SIGNIFICANT CHANGE UP (ref 8.4–10.5)
CHLORIDE SERPL-SCNC: 100 MMOL/L — SIGNIFICANT CHANGE UP (ref 96–108)
CHLORIDE SERPL-SCNC: 101 MMOL/L — SIGNIFICANT CHANGE UP (ref 96–108)
CK SERPL-CCNC: 8962 U/L — HIGH (ref 25–170)
CK SERPL-CCNC: CRITICAL HIGH U/L (ref 25–170)
CO2 SERPL-SCNC: 25 MMOL/L — SIGNIFICANT CHANGE UP (ref 22–31)
CO2 SERPL-SCNC: 25 MMOL/L — SIGNIFICANT CHANGE UP (ref 22–31)
CREAT SERPL-MCNC: 1.46 MG/DL — HIGH (ref 0.5–1.3)
CREAT SERPL-MCNC: 1.89 MG/DL — HIGH (ref 0.5–1.3)
EOSINOPHIL # BLD AUTO: 0 K/UL — SIGNIFICANT CHANGE UP (ref 0–0.5)
EOSINOPHIL NFR BLD AUTO: 0 % — SIGNIFICANT CHANGE UP (ref 0–6)
GLUCOSE BLDC GLUCOMTR-MCNC: 106 MG/DL — HIGH (ref 70–99)
GLUCOSE BLDC GLUCOMTR-MCNC: 142 MG/DL — HIGH (ref 70–99)
GLUCOSE BLDC GLUCOMTR-MCNC: 159 MG/DL — HIGH (ref 70–99)
GLUCOSE BLDC GLUCOMTR-MCNC: 188 MG/DL — HIGH (ref 70–99)
GLUCOSE SERPL-MCNC: 130 MG/DL — HIGH (ref 70–99)
GLUCOSE SERPL-MCNC: 130 MG/DL — HIGH (ref 70–99)
HCT VFR BLD CALC: 40.9 % — SIGNIFICANT CHANGE UP (ref 34.5–45)
HGB BLD-MCNC: 13.3 G/DL — SIGNIFICANT CHANGE UP (ref 11.5–15.5)
LYMPHOCYTES # BLD AUTO: 0.73 K/UL — LOW (ref 1–3.3)
LYMPHOCYTES # BLD AUTO: 6.2 % — LOW (ref 13–44)
MAGNESIUM SERPL-MCNC: 3 MG/DL — HIGH (ref 1.6–2.6)
MCHC RBC-ENTMCNC: 29.2 PG — SIGNIFICANT CHANGE UP (ref 27–34)
MCHC RBC-ENTMCNC: 32.5 GM/DL — SIGNIFICANT CHANGE UP (ref 32–36)
MCV RBC AUTO: 89.7 FL — SIGNIFICANT CHANGE UP (ref 80–100)
MONOCYTES # BLD AUTO: 0.73 K/UL — SIGNIFICANT CHANGE UP (ref 0–0.9)
MONOCYTES NFR BLD AUTO: 6.2 % — SIGNIFICANT CHANGE UP (ref 2–14)
NEUTROPHILS # BLD AUTO: 9.98 K/UL — HIGH (ref 1.8–7.4)
NEUTROPHILS NFR BLD AUTO: 81.4 % — HIGH (ref 43–77)
NRBC # BLD: 0 /100 WBCS — SIGNIFICANT CHANGE UP (ref 0–0)
PHOSPHATE SERPL-MCNC: 3.6 MG/DL — SIGNIFICANT CHANGE UP (ref 2.5–4.5)
PLATELET # BLD AUTO: 218 K/UL — SIGNIFICANT CHANGE UP (ref 150–400)
POTASSIUM SERPL-MCNC: 3.6 MMOL/L — SIGNIFICANT CHANGE UP (ref 3.5–5.3)
POTASSIUM SERPL-MCNC: 4 MMOL/L — SIGNIFICANT CHANGE UP (ref 3.5–5.3)
POTASSIUM SERPL-SCNC: 3.6 MMOL/L — SIGNIFICANT CHANGE UP (ref 3.5–5.3)
POTASSIUM SERPL-SCNC: 4 MMOL/L — SIGNIFICANT CHANGE UP (ref 3.5–5.3)
PROT SERPL-MCNC: 7.1 G/DL — SIGNIFICANT CHANGE UP (ref 6–8.3)
RAPID RVP RESULT: SIGNIFICANT CHANGE UP
RBC # BLD: 4.56 M/UL — SIGNIFICANT CHANGE UP (ref 3.8–5.2)
RBC # FLD: 14.4 % — SIGNIFICANT CHANGE UP (ref 10.3–14.5)
SODIUM SERPL-SCNC: 137 MMOL/L — SIGNIFICANT CHANGE UP (ref 135–145)
SODIUM SERPL-SCNC: 138 MMOL/L — SIGNIFICANT CHANGE UP (ref 135–145)
T4 FREE SERPL-MCNC: 0.8 NG/DL — SIGNIFICANT CHANGE UP (ref 0.7–1.48)
TSH SERPL-MCNC: 0.55 UIU/ML — SIGNIFICANT CHANGE UP (ref 0.35–4.94)
WBC # BLD: 11.76 K/UL — HIGH (ref 3.8–10.5)
WBC # FLD AUTO: 11.76 K/UL — HIGH (ref 3.8–10.5)

## 2020-08-21 PROCEDURE — 99233 SBSQ HOSP IP/OBS HIGH 50: CPT | Mod: GC

## 2020-08-21 PROCEDURE — 71250 CT THORAX DX C-: CPT | Mod: 26

## 2020-08-21 PROCEDURE — 71045 X-RAY EXAM CHEST 1 VIEW: CPT | Mod: 26

## 2020-08-21 RX ORDER — POLYETHYLENE GLYCOL 3350 17 G/17G
17 POWDER, FOR SOLUTION ORAL DAILY
Refills: 0 | Status: DISCONTINUED | OUTPATIENT
Start: 2020-08-21 | End: 2020-08-26

## 2020-08-21 RX ORDER — SENNA PLUS 8.6 MG/1
2 TABLET ORAL AT BEDTIME
Refills: 0 | Status: DISCONTINUED | OUTPATIENT
Start: 2020-08-21 | End: 2020-08-26

## 2020-08-21 RX ORDER — POTASSIUM CHLORIDE 20 MEQ
40 PACKET (EA) ORAL ONCE
Refills: 0 | Status: COMPLETED | OUTPATIENT
Start: 2020-08-21 | End: 2020-08-21

## 2020-08-21 RX ORDER — SODIUM CHLORIDE 9 MG/ML
1000 INJECTION INTRAMUSCULAR; INTRAVENOUS; SUBCUTANEOUS
Refills: 0 | Status: DISCONTINUED | OUTPATIENT
Start: 2020-08-21 | End: 2020-08-22

## 2020-08-21 RX ADMIN — Medication 100 MILLIGRAM(S): at 22:51

## 2020-08-21 RX ADMIN — Medication 2: at 12:29

## 2020-08-21 RX ADMIN — SODIUM CHLORIDE 125 MILLILITER(S): 9 INJECTION INTRAMUSCULAR; INTRAVENOUS; SUBCUTANEOUS at 10:37

## 2020-08-21 RX ADMIN — POLYETHYLENE GLYCOL 3350 17 GRAM(S): 17 POWDER, FOR SOLUTION ORAL at 11:03

## 2020-08-21 RX ADMIN — CEFTRIAXONE 100 MILLIGRAM(S): 500 INJECTION, POWDER, FOR SOLUTION INTRAMUSCULAR; INTRAVENOUS at 22:06

## 2020-08-21 RX ADMIN — AZITHROMYCIN 255 MILLIGRAM(S): 500 TABLET, FILM COATED ORAL at 19:03

## 2020-08-21 RX ADMIN — Medication 40 MILLIEQUIVALENT(S): at 19:03

## 2020-08-21 RX ADMIN — SENNA PLUS 2 TABLET(S): 8.6 TABLET ORAL at 22:06

## 2020-08-21 RX ADMIN — ENOXAPARIN SODIUM 30 MILLIGRAM(S): 100 INJECTION SUBCUTANEOUS at 11:54

## 2020-08-21 NOTE — PROGRESS NOTE ADULT - PROBLEM SELECTOR PLAN 7
-initial labs showing troponin 0.01, however on repeat, troponin 0.06, bnp 464; troponin now leveled off at 0.06

## 2020-08-21 NOTE — PROGRESS NOTE ADULT - SUBJECTIVE AND OBJECTIVE BOX
Subjective/ONE: Patient spiked fever 103, now resolved. She reports shortness of breath is improving and coughing less.    MEDICATIONS  (STANDING):  azithromycin  IVPB 500 milliGRAM(s) IV Intermittent every 24 hours  cefTRIAXone   IVPB 2000 milliGRAM(s) IV Intermittent every 24 hours  dextrose 5%. 1000 milliLiter(s) (50 mL/Hr) IV Continuous <Continuous>  dextrose 50% Injectable 12.5 Gram(s) IV Push once  dextrose 50% Injectable 25 Gram(s) IV Push once  dextrose 50% Injectable 25 Gram(s) IV Push once  enoxaparin Injectable 30 milliGRAM(s) SubCutaneous every 24 hours  insulin lispro (HumaLOG) corrective regimen sliding scale   SubCutaneous three times a day before meals  insulin lispro (HumaLOG) corrective regimen sliding scale   SubCutaneous at bedtime  polyethylene glycol 3350 17 Gram(s) Oral daily  senna 2 Tablet(s) Oral at bedtime  sodium chloride 0.9%. 1000 milliLiter(s) (125 mL/Hr) IV Continuous <Continuous>    MEDICATIONS  (PRN):  acetaminophen   Tablet .. 650 milliGRAM(s) Oral every 4 hours PRN Mild Pain (1 - 3)  acetaminophen   Tablet .. 650 milliGRAM(s) Oral once PRN Temp greater or equal to 38C (100.4F)  benzonatate 100 milliGRAM(s) Oral three times a day PRN Cough  dextrose 40% Gel 15 Gram(s) Oral once PRN Blood Glucose LESS THAN 70 milliGRAM(s)/deciliter  glucagon  Injectable 1 milliGRAM(s) IntraMuscular once PRN Glucose LESS THAN 70 milligrams/deciliter  guaifenesin/dextromethorphan  Syrup 10 milliLiter(s) Oral three times a day PRN Cough    Allergies    No Known Allergies    Intolerances      Vital Signs Last 24 Hrs  T(C): 36.6 (21 Aug 2020 05:45), Max: 39.4 (20 Aug 2020 21:15)  T(F): 97.9 (21 Aug 2020 05:45), Max: 103 (20 Aug 2020 21:15)  HR: 92 (21 Aug 2020 05:45) (92 - 107)  BP: 128/72 (21 Aug 2020 05:45) (128/72 - 140/70)  BP(mean): --  ABP: --  ABP(mean): --  RR: 17 (21 Aug 2020 05:45) (17 - 20)  SpO2: 95% (21 Aug 2020 05:45) (93% - 95%)      PHYSICAL EXAM:  GENERAL: Obese woman, speaking in full sentences without increased work of breathing on 3L NC  HEAD:  Atraumatic, Normocephalic  EYES: Conjunctiva and sclera clear  HEART: Regular rate and rhythm; No murmurs, rubs, or gallops  RESPIRATORY: scant crackles in b/l posterior lung fields  ABDOMEN: Soft, Nontender, Nondistended; Bowel sounds present  NEUROLOGY: A&Ox3, nonfocal, moving all extremities  EXTREMITIES:  2+ Peripheral Pulses, No clubbing, cyanosis, or edema  SKIN: warm, dry, normal color, no rash or abnormal lesions; +sacral decubitus pressure ulcer with dressing      LABS:  08-21 @ 06:26 Creatine 48990 U/L<HH> [25 - 170]  08-20 @ 08:36 Creatine 8445 U/L<H> [25 - 170]  cret                        13.3   11.76 )-----------( 218      ( 21 Aug 2020 06:26 )             40.9     08-21    138  |  100  |  64<H>  ----------------------------<  130<H>  4.0   |  25  |  1.89<H>    Ca    9.0      21 Aug 2020 06:26  Phos  3.6     08-21  Mg     3.0     08-21    TPro  7.1  /  Alb  2.6<L>  /  TBili  0.4  /  DBili  x   /  AST  309<H>  /  ALT  101<H>  /  AlkPhos  67  08-21

## 2020-08-21 NOTE — PROGRESS NOTE ADULT - PROBLEM SELECTOR PLAN 3
rhabdomyolysis > cardiac release  -CK uptrending 11,019>8445>4072>1664  -trop: .06      Plan:    -trend CK (4pm draw today)  -restarting fluids Pt found down for ~3days  rhabdomyolysis more likely than cardiac release  -CK uptrending 11,019>8445>4072>1664  -trop: .06    Plan:  -trend CK (4pm draw today)  -restarting fluids

## 2020-08-21 NOTE — PROGRESS NOTE ADULT - PROBLEM SELECTOR PLAN 4
Uptrending AST/ALT: 309/101 from 195/77  -more likely due to rhabdo than sepsis (neg hep panel, drug tox and acetaminophen levels)  -pt denies alcohol use    Plan:  -trend LFTs      #Incidental cord compression  -not currently c/o weakness or sensory deficits  Plan:  -per spinal service recs - ordered MR of spine

## 2020-08-21 NOTE — PROGRESS NOTE ADULT - ASSESSMENT
65F who reports no significant PMHx, BIBEMS after being found down at home for ~3days, complaining of confusion, cough and back pain, admitted for sepsis 2/2 LLL pneumonia s/p fluids in ED with possible fluid overload, with acute respiratory hypoxemia requiring oxygen supplementation and uptrending CK likely rhabdo.

## 2020-08-21 NOTE — OCCUPATIONAL THERAPY INITIAL EVALUATION ADULT - PLANNED THERAPY INTERVENTIONS, OT EVAL
ADL retraining/bed mobility training/strengthening/transfer training/IADL retraining/balance training

## 2020-08-21 NOTE — PROGRESS NOTE ADULT - PROBLEM SELECTOR PLAN 6
-IN ED, SIRS criteria met: Tmax 100.8, , RR 20  -likely PNA given LLL opacity on CXR with new onset productive cough and fevers  -UA negative  -s/p 3.7L NS, vanc, zosyn in ED    Plan:  -tx CAP as discussed above

## 2020-08-21 NOTE — PROGRESS NOTE ADULT - PROBLEM SELECTOR PLAN 10
F: caution with fluids given overload status  E: replete PRN  N: DM diet  DVT ppx: renally dosed lovenox  GI ppx: not indicated    Code Status: Full Code  Dispo: [email or web address] F: caution with fluids given overload status. restarted at 125cc/hr as concern for rhabdo with documented EF:57%  E: replete PRN  N: DM diet  DVT ppx: renally dosed lovenox  GI ppx: not indicated    Code Status: Full Code  Dispo: RM

## 2020-08-21 NOTE — PROGRESS NOTE ADULT - PROBLEM SELECTOR PLAN 2
Pt not on oxygen at home, now requiring 2-3L NC (stating 93-94% on 3L, 88% on 1L). Likely 2/2 PNA vs fluid overload status.  -pt denies hx of CHF, but cardiomegaly with pulmonary congestion on CXR and bibasilar crackles on exam. TTE c/w systolic EF 57%, with mild increase in R atrial pressure. No pericardial effusion.    Plan:  -target O2 sat >94%  -tx PNA as discussed above  -cont duonebs  -strict Is+Os Pt not on oxygen at home, now requiring 2-3L NC (stating 93-94% on 3L, 88% on 1L). Likely 2/2 PNA vs fluid overload status.  -pt denies hx of CHF, but cardiomegaly with pulmonary congestion on CXR and bibasilar crackles on exam. TTE: systolic EF 57%, with mild increase in R atrial pressure. No pericardial effusion.    Plan:  -target O2 sat >94%  -tx PNA as discussed above  -cont duonebs  -strict Is+Os  -monitor resp status and repeat CXR to assess fluid overload if worsens as pt restarting fluids

## 2020-08-21 NOTE — PROGRESS NOTE ADULT - PROBLEM SELECTOR PLAN 8
Hb now 13.3>12.8>11.4; first sample likely dilutional (7.8)  -no signs of active bleed (melena, BRBPR, menorrhagia)    Plan:  -trend hb  -maintain T+S  -transfuse for goal Hb>7  -f/u Fe studies Hb resolved; first sample likely dilutional from drawn from arm with fluids running (7.8)  -no signs of active bleed (melena, BRBPR, menorrhagia)    Plan:  -trend hb  -transfuse for goal Hb>7

## 2020-08-21 NOTE — PROGRESS NOTE ADULT - PROBLEM SELECTOR PLAN 1
Treating for CAP as pt has been home with no recent hospitalizations  -Pt presented w sepsis (Tmax 100.8, , RR 20), AMS, productive cough and LLL opacity on CXR c/w PNA. Received 1 dose of Vanc/Zosyn in ED, 3.7L NS. Now on ceftriaxone and azithromycin for suspected CAP.     Plan:  -extend courses of azithromycin and ceftriaxone per pending blood cxs; consider broad spectrum abx if recurrent fever  -RVP swab obtained and sent  -f/u sputum culture  -f/u blood cultures  -CBC with diff ordered  -supplemental O2 as needed  -robitussin and tessalon perles PRN for cough  -f/u AM CXR Treating for CAP as pt has been home with no recent hospitalizations  -Pt presented w sepsis (Tmax 100.8, , RR 20), AMS, productive cough and LLL opacity on CXR c/w PNA. Received 1 dose of Vanc/Zosyn in ED, 3.7L NS. Now on ceftriaxone and azithromycin for suspected CAP.     Plan:  -cont azithromycin (8/19- ) and ceftriaxone (8/19- ), may extend course due to recurrent fever overnight with new Tmax  -consider broadening coverage to vanc and zosyn if recurrent fever  -RVP swab obtained and sent  -f/u sputum culture: unable to send as pt not making sputum  -f/u blood cultures: NGTD  -CBC with diff ordered  -supplemental O2 as needed  -robitussin and tessalon perles PRN for cough

## 2020-08-22 LAB
ALBUMIN SERPL ELPH-MCNC: 2.3 G/DL — LOW (ref 3.3–5)
ALP SERPL-CCNC: 65 U/L — SIGNIFICANT CHANGE UP (ref 40–120)
ALT FLD-CCNC: 109 U/L — HIGH (ref 10–45)
ANION GAP SERPL CALC-SCNC: 10 MMOL/L — SIGNIFICANT CHANGE UP (ref 5–17)
AST SERPL-CCNC: 301 U/L — HIGH (ref 10–40)
BASOPHILS # BLD AUTO: 0.07 K/UL — SIGNIFICANT CHANGE UP (ref 0–0.2)
BASOPHILS NFR BLD AUTO: 0.9 % — SIGNIFICANT CHANGE UP (ref 0–2)
BILIRUB SERPL-MCNC: 0.3 MG/DL — SIGNIFICANT CHANGE UP (ref 0.2–1.2)
BUN SERPL-MCNC: 33 MG/DL — HIGH (ref 7–23)
CALCIUM SERPL-MCNC: 7.8 MG/DL — LOW (ref 8.4–10.5)
CHLORIDE SERPL-SCNC: 104 MMOL/L — SIGNIFICANT CHANGE UP (ref 96–108)
CK SERPL-CCNC: 7786 U/L — HIGH (ref 25–170)
CO2 SERPL-SCNC: 25 MMOL/L — SIGNIFICANT CHANGE UP (ref 22–31)
CREAT SERPL-MCNC: 1.19 MG/DL — SIGNIFICANT CHANGE UP (ref 0.5–1.3)
EOSINOPHIL # BLD AUTO: 0.59 K/UL — HIGH (ref 0–0.5)
EOSINOPHIL NFR BLD AUTO: 7.1 % — HIGH (ref 0–6)
GLUCOSE BLDC GLUCOMTR-MCNC: 111 MG/DL — HIGH (ref 70–99)
GLUCOSE BLDC GLUCOMTR-MCNC: 112 MG/DL — HIGH (ref 70–99)
GLUCOSE BLDC GLUCOMTR-MCNC: 158 MG/DL — HIGH (ref 70–99)
GLUCOSE BLDC GLUCOMTR-MCNC: 201 MG/DL — HIGH (ref 70–99)
GLUCOSE SERPL-MCNC: 118 MG/DL — HIGH (ref 70–99)
HCT VFR BLD CALC: 32.5 % — LOW (ref 34.5–45)
HGB BLD-MCNC: 11.2 G/DL — LOW (ref 11.5–15.5)
LYMPHOCYTES # BLD AUTO: 0.37 K/UL — LOW (ref 1–3.3)
LYMPHOCYTES # BLD AUTO: 4.5 % — LOW (ref 13–44)
MAGNESIUM SERPL-MCNC: 2.4 MG/DL — SIGNIFICANT CHANGE UP (ref 1.6–2.6)
MCHC RBC-ENTMCNC: 30.8 PG — SIGNIFICANT CHANGE UP (ref 27–34)
MCHC RBC-ENTMCNC: 34.5 GM/DL — SIGNIFICANT CHANGE UP (ref 32–36)
MCV RBC AUTO: 89.3 FL — SIGNIFICANT CHANGE UP (ref 80–100)
MONOCYTES # BLD AUTO: 0.97 K/UL — HIGH (ref 0–0.9)
MONOCYTES NFR BLD AUTO: 11.6 % — SIGNIFICANT CHANGE UP (ref 2–14)
NEUTROPHILS # BLD AUTO: 6.02 K/UL — SIGNIFICANT CHANGE UP (ref 1.8–7.4)
NEUTROPHILS NFR BLD AUTO: 72.3 % — SIGNIFICANT CHANGE UP (ref 43–77)
PHOSPHATE SERPL-MCNC: 2.6 MG/DL — SIGNIFICANT CHANGE UP (ref 2.5–4.5)
PLATELET # BLD AUTO: 324 K/UL — SIGNIFICANT CHANGE UP (ref 150–400)
POTASSIUM SERPL-MCNC: 3.7 MMOL/L — SIGNIFICANT CHANGE UP (ref 3.5–5.3)
POTASSIUM SERPL-SCNC: 3.7 MMOL/L — SIGNIFICANT CHANGE UP (ref 3.5–5.3)
PROT SERPL-MCNC: 6.2 G/DL — SIGNIFICANT CHANGE UP (ref 6–8.3)
RBC # BLD: 3.64 M/UL — LOW (ref 3.8–5.2)
RBC # FLD: 14.6 % — HIGH (ref 10.3–14.5)
SODIUM SERPL-SCNC: 139 MMOL/L — SIGNIFICANT CHANGE UP (ref 135–145)
WBC # BLD: 8.33 K/UL — SIGNIFICANT CHANGE UP (ref 3.8–10.5)
WBC # FLD AUTO: 8.33 K/UL — SIGNIFICANT CHANGE UP (ref 3.8–10.5)

## 2020-08-22 PROCEDURE — 71045 X-RAY EXAM CHEST 1 VIEW: CPT | Mod: 26,76

## 2020-08-22 PROCEDURE — 99233 SBSQ HOSP IP/OBS HIGH 50: CPT | Mod: GC

## 2020-08-22 PROCEDURE — 72148 MRI LUMBAR SPINE W/O DYE: CPT | Mod: 26

## 2020-08-22 PROCEDURE — 72141 MRI NECK SPINE W/O DYE: CPT | Mod: 26

## 2020-08-22 RX ORDER — AZITHROMYCIN 500 MG/1
500 TABLET, FILM COATED ORAL EVERY 24 HOURS
Refills: 0 | Status: DISCONTINUED | OUTPATIENT
Start: 2020-08-22 | End: 2020-08-22

## 2020-08-22 RX ORDER — POTASSIUM CHLORIDE 20 MEQ
40 PACKET (EA) ORAL ONCE
Refills: 0 | Status: DISCONTINUED | OUTPATIENT
Start: 2020-08-22 | End: 2020-08-22

## 2020-08-22 RX ORDER — POTASSIUM CHLORIDE 20 MEQ
40 PACKET (EA) ORAL ONCE
Refills: 0 | Status: COMPLETED | OUTPATIENT
Start: 2020-08-22 | End: 2020-08-22

## 2020-08-22 RX ORDER — FUROSEMIDE 40 MG
20 TABLET ORAL ONCE
Refills: 0 | Status: COMPLETED | OUTPATIENT
Start: 2020-08-22 | End: 2020-08-22

## 2020-08-22 RX ORDER — IPRATROPIUM/ALBUTEROL SULFATE 18-103MCG
3 AEROSOL WITH ADAPTER (GRAM) INHALATION ONCE
Refills: 0 | Status: COMPLETED | OUTPATIENT
Start: 2020-08-22 | End: 2020-08-22

## 2020-08-22 RX ORDER — AZITHROMYCIN 500 MG/1
500 TABLET, FILM COATED ORAL EVERY 24 HOURS
Refills: 0 | Status: DISCONTINUED | OUTPATIENT
Start: 2020-08-22 | End: 2020-08-24

## 2020-08-22 RX ADMIN — Medication 20 MILLIGRAM(S): at 09:15

## 2020-08-22 RX ADMIN — ENOXAPARIN SODIUM 30 MILLIGRAM(S): 100 INJECTION SUBCUTANEOUS at 11:40

## 2020-08-22 RX ADMIN — AZITHROMYCIN 255 MILLIGRAM(S): 500 TABLET, FILM COATED ORAL at 18:20

## 2020-08-22 RX ADMIN — Medication 3 MILLILITER(S): at 07:53

## 2020-08-22 RX ADMIN — POLYETHYLENE GLYCOL 3350 17 GRAM(S): 17 POWDER, FOR SOLUTION ORAL at 11:40

## 2020-08-22 RX ADMIN — CEFTRIAXONE 100 MILLIGRAM(S): 500 INJECTION, POWDER, FOR SOLUTION INTRAMUSCULAR; INTRAVENOUS at 21:09

## 2020-08-22 RX ADMIN — Medication 40 MILLIEQUIVALENT(S): at 18:54

## 2020-08-22 NOTE — PROGRESS NOTE ADULT - PROBLEM SELECTOR PLAN 3
Pt found down for ~3days  rhabdomyolysis more likely than cardiac release  -CK downtrending  -trop: .06    Plan:  -cont to trend CK  -d/c'ed fluids due to resp status and downtrending Cr and CK

## 2020-08-22 NOTE — PROGRESS NOTE ADULT - PROBLEM SELECTOR PLAN 5
Cr 1.89 (<2.15) with unknown baseline. Unknown whether CKD or HERI.  -trend Cr  - resolving  - d/c'ed fluids due to resp status and downtrending Cr

## 2020-08-22 NOTE — PROGRESS NOTE ADULT - PROBLEM SELECTOR PLAN 10
F: caution with fluids given overload status. restarted at 125cc/hr as concern for rhabdo with documented EF:57%  E: replete PRN  N: DM diet  DVT ppx: renally dosed lovenox  GI ppx: not indicated    Code Status: Full Code  Dispo: RM

## 2020-08-22 NOTE — PROGRESS NOTE ADULT - PROBLEM SELECTOR PLAN 1
Treating for CAP as pt has been home with no recent hospitalizations  -Pt presented w sepsis (Tmax 100.8, , RR 20), AMS, productive cough and LLL opacity on CXR c/w PNA. Received 1 dose of Vanc/Zosyn in ED, 3.7L NS. Now on ceftriaxone and azithromycin for suspected CAP.     Plan:  -cont azithromycin (8/19- ) and ceftriaxone (8/19- ), may extend course due to recurrent fever overnight with new Tmax  -consider broadening coverage to vanc and zosyn if recurrent fever  -RVP: negative  -f/u sputum culture: unable to send as pt not making sputum  -f/u blood cultures: NGTD  -trend CBC  -supplemental O2 as needed  -robitussin and tessalon perles PRN for cough Treating for CAP as pt has been home with no recent hospitalizations  -Pt presented w sepsis (Tmax 100.8, , RR 20), AMS, productive cough and LLL opacity on CXR c/w PNA. Received 1 dose of Vanc/Zosyn in ED, 3.7L NS. Now on ceftriaxone and azithromycin for suspected CAP.   -CT: Extensive multifocal bilateral dependent/basilar pneumonia, most severe in the lower lobes, likely secondary to aspiration. Trace left pleural effusion        Plan:  -cont azithromycin (8/19- ) and ceftriaxone (8/19- ), may extend course due to recurrent fever overnight with new Tmax  -Currently afebrile on ceftriaxone and azithromycin. Can consider broadening to cover anaerobes if spikes fever, but at that point would have to treat for HAP with vanc and zosyn  -RVP: negative  -f/u sputum culture: unable to send as pt not making sputum  -f/u blood cultures: NGTD  -trend CBC  -supplemental O2 as needed  -robitussin and tessalon perles PRN for cough

## 2020-08-22 NOTE — PROGRESS NOTE ADULT - SUBJECTIVE AND OBJECTIVE BOX
Ortho Note    Pt comfortable without complaints, pain controlled  Denies CP, SOB, N/V, numbness/tingling     Vital Signs Last 24 Hrs  T(C): 37 (08-22-20 @ 21:30), Max: 37 (08-22-20 @ 21:30)  T(F): 98.6 (08-22-20 @ 21:30), Max: 98.6 (08-22-20 @ 21:30)  HR: 87 (08-22-20 @ 21:30) (87 - 87)  BP: 145/86 (08-22-20 @ 21:30) (145/86 - 145/86)  BP(mean): --  RR: 20 (08-22-20 @ 21:30) (20 - 20)  SpO2: 94% (08-22-20 @ 21:30) (94% - 94%)  AVSS    Physical Exam:  Gen: well appearing   BL UE sensation intact and symmetric   BL UE motor strength intact and symmetric   2+ rad pulse  No lane sign                             11.2   8.33  )-----------( 324      ( 22 Aug 2020 08:29 )             32.5   22 Aug 2020 08:29    139    |  104    |  33     ----------------------------<  118    3.7     |  25     |  1.19     Phos  2.6       22 Aug 2020 08:29  Mg     2.4       22 Aug 2020 08:29    TPro  6.2    /  Alb  2.3    /  TBili  0.3    /  DBili  x      /  AST  301    /  ALT  109    /  AlkPhos  65     22 Aug 2020 08:29        A/P: 65F admitted for AMS and workup CT C-spine showing C4-5, C5-6 and C6-7 mild spinal cord compression with no focal deficits     - recommend MRI C, T and L spine to further evaluate  - rest of recs pending MRI results   - Ortho Spine will continue to follow    -Discussed with Dr. Brannon      Ortho Pager [phone]

## 2020-08-22 NOTE — PROGRESS NOTE ADULT - PROBLEM SELECTOR PLAN 8
Hb resolved; first sample likely dilutional from drawn from arm with fluids running (7.8)  -no signs of active bleed (melena, BRBPR, menorrhagia)    Plan:  -trend hb  -transfuse for goal Hb>7

## 2020-08-22 NOTE — PROGRESS NOTE ADULT - SUBJECTIVE AND OBJECTIVE BOX
Pt seen and examined at bedside.  Reports breathing slightly improved since yesterday.    Vital Signs Last 24 Hrs  T(C): 37.2 (22 Aug 2020 05:58), Max: 37.2 (22 Aug 2020 05:58)  T(F): 99 (22 Aug 2020 05:58), Max: 99 (22 Aug 2020 05:58)  HR: 84 (22 Aug 2020 05:58) (84 - 89)  BP: 144/86 (22 Aug 2020 05:58) (125/81 - 144/86)  BP(mean): --  RR: 20 (22 Aug 2020 05:58) (18 - 20)  SpO2: 95% (22 Aug 2020 05:58) (93% - 98%)    AA and O x3 NAD  NCAT  neck supple  s1s2 RRR, no murmurs  lungs b/l scattered crackles   abd soft NTND, +BS x 4 quadrants  ext no edema  no focal deficits  skin w/w/p    Comprehensive Metabolic Panel in AM (08.22.20 @ 08:29)    Sodium, Serum: 139 mmol/L    Potassium, Serum: 3.7 mmol/L    Chloride, Serum: 104 mmol/L    Carbon Dioxide, Serum: 25 mmol/L    Anion Gap, Serum: 10 mmol/L    Blood Urea Nitrogen, Serum: 33: Checked result, consistent with patient history mg/dL    Creatinine, Serum: 1.19 mg/dL    Glucose, Serum: 118 mg/dL    Calcium, Total Serum: 7.8 mg/dL    Protein Total, Serum: 6.2 g/dL    Albumin, Serum: 2.3 g/dL    Bilirubin Total, Serum: 0.3 mg/dL    Alkaline Phosphatase, Serum: 65 U/L    Aspartate Aminotransferase (AST/SGOT): 301 U/L    Alanine Aminotransferase (ALT/SGPT): 109 U/L    eGFR if Non : 47: The units for eGFR are ml/min/1.73m2 (normalized body surface area). The  eGFR is calculated from a serum creatinine using the CKD-EPI equation.  Other variables required for calculation are race, age and sex. Among  patients with chronic kidney disease (CKD), the eGFR is useful in  determining the stage of disease according to KDOQI CKD classification.  All eGFR results are reported numerically with the following  interpretation.          GFR                    With                        Without     (ml/min/1.73 m2)    Kidney Damage       Kidney Damage        >= 90                    Stage 1                     Normal        60-89                    Stage 2                     Decreased GFR        30-59                    Stage 3         Stage 3        15-29                    Stage 4                     Stage 4        < 15                      Stage 5                     Stage 5  Each stage of CKD assumes that the associated GFR level has been in  effect for at least 3 months. Determination of stages one and two (with  eGFR > 59 ml/min/m2) requires estimation of kidney damage for at least 3  months as defined by structural or functional abnormalities.  Limitations: All estimates of GFR will be less accurate for patientsat  extremes of muscle mass (including but not limited to frail elderly,  critically ill, or cancer patients), those with unusual diets, and those  with conditions associated with reduced secretion or extrarenal  elimination of creatinine. The eGFR equation is not recommended for use  in patients with unstable creatinine levels. mL/min/1.73M2    eGFR if : 55: The units for eGFR are ml/min/1.73m2 (normalized body surface area). The  eGFR is calculated from a serum creatinine using the CKD-EPI equation.  Other variables required for calculation are race, age and sex. Among  patients with chronic kidney disease (CKD), the eGFR is useful in  determining the stage of disease according to KDOQI CKD classification.  All eGFR results are reported numerically with the following  interpretation.          GFR                    With                        Without     (ml/min/1.73 m2)    Kidney Damage       Kidney Damage        >= 90                    Stage 1                     Normal        60-89                    Stage 2                     Decreased GFR        30-59                    Stage 3         Stage 3        15-29                    Stage 4                     Stage 4        < 15                      Stage 5                     Stage 5  Each stage of CKD assumes that the associated GFR level has been in  effect for at least 3 months. Determination of stages one and two (with  eGFR > 59 ml/min/m2) requires estimation of kidney damage for at least 3  months as defined by structural or functional abnormalities.  Limitations: All estimates of GFR will be less accurate for patientsat  extremes of muscle mass (including but not limited to frail elderly,  critically ill, or cancer patients), those with unusual diets, and those  with conditions associated with reduced secretion or extrarenal  elimination of creatinine. The eGFR equation is not recommended for use  in patients with unstable creatinine levels. mL/min/1.73M2

## 2020-08-22 NOTE — PROGRESS NOTE ADULT - SUBJECTIVE AND OBJECTIVE BOX
CC: Patient is a 65y old  Female who presents with a chief complaint of AMS, cough (22 Aug 2020 15:06)      OVERNIGHT EVENTS: MARCELA    SUBJECTIVE / INTERVAL HPI: Patient seen and examined at bedside. Pt currently saying that she is not sob, but says that her breath is more labored this morning. Denies chest pain, new cough, n/v/d/c, leg pain/swelling. CXR showed fluid overload, given duonebs and 20mg lasix with symptomatic improvement.    ROS: negative unless otherwise stated above.    VITAL SIGNS:  Vital Signs Last 24 Hrs  T(C): 37.1 (22 Aug 2020 15:41), Max: 37.2 (22 Aug 2020 05:58)  T(F): 98.8 (22 Aug 2020 15:41), Max: 99 (22 Aug 2020 05:58)  HR: 83 (22 Aug 2020 15:41) (83 - 84)  BP: 141/85 (22 Aug 2020 15:41) (125/81 - 144/86)  BP(mean): --  RR: 18 (22 Aug 2020 15:41) (18 - 20)  SpO2: 94% (22 Aug 2020 15:41) (93% - 95%)    PHYSICAL EXAM:    General: WDWN  HEENT: MMM  Neck: supple  Cardiovascular: +S1/S2; RRR  Respiratory: diffuse wheezing and bibasilar crackles  Gastrointestinal: soft, NT/ND; +BSx4  Extremities: WWP; no edema, clubbing or cyanosis  Vascular: 2+ radial, DP/PT pulses B/L  Neurological: AAOx3; no focal deficits    MEDICATIONS:  MEDICATIONS  (STANDING):  azithromycin  IVPB 500 milliGRAM(s) IV Intermittent every 24 hours  cefTRIAXone   IVPB 2000 milliGRAM(s) IV Intermittent every 24 hours  dextrose 5%. 1000 milliLiter(s) (50 mL/Hr) IV Continuous <Continuous>  dextrose 50% Injectable 12.5 Gram(s) IV Push once  dextrose 50% Injectable 25 Gram(s) IV Push once  dextrose 50% Injectable 25 Gram(s) IV Push once  enoxaparin Injectable 30 milliGRAM(s) SubCutaneous every 24 hours  insulin lispro (HumaLOG) corrective regimen sliding scale   SubCutaneous three times a day before meals  insulin lispro (HumaLOG) corrective regimen sliding scale   SubCutaneous at bedtime  polyethylene glycol 3350 17 Gram(s) Oral daily  potassium chloride   Powder 40 milliEquivalent(s) Oral once  senna 2 Tablet(s) Oral at bedtime    MEDICATIONS  (PRN):  acetaminophen   Tablet .. 650 milliGRAM(s) Oral every 4 hours PRN Mild Pain (1 - 3)  acetaminophen   Tablet .. 650 milliGRAM(s) Oral once PRN Temp greater or equal to 38C (100.4F)  benzonatate 100 milliGRAM(s) Oral three times a day PRN Cough  dextrose 40% Gel 15 Gram(s) Oral once PRN Blood Glucose LESS THAN 70 milliGRAM(s)/deciliter  glucagon  Injectable 1 milliGRAM(s) IntraMuscular once PRN Glucose LESS THAN 70 milligrams/deciliter  guaifenesin/dextromethorphan  Syrup 10 milliLiter(s) Oral three times a day PRN Cough      ALLERGIES:  Allergies    No Known Allergies    Intolerances        LABS:                        11.2   8.33  )-----------( 324      ( 22 Aug 2020 08:29 )             32.5     08-22    139  |  104  |  33<H>  ----------------------------<  118<H>  3.7   |  25  |  1.19    Ca    7.8<L>      22 Aug 2020 08:29  Phos  2.6     08-22  Mg     2.4     08-22    TPro  6.2  /  Alb  2.3<L>  /  TBili  0.3  /  DBili  x   /  AST  301<H>  /  ALT  109<H>  /  AlkPhos  65  08-22        CAPILLARY BLOOD GLUCOSE      POCT Blood Glucose.: 201 mg/dL (22 Aug 2020 12:08)      RADIOLOGY & ADDITIONAL TESTS: Reviewed.

## 2020-08-22 NOTE — PROGRESS NOTE ADULT - PROBLEM SELECTOR PLAN 2
Pt not on oxygen at home, now requiring 2-3L NC (stating 93-94% on 3L, 88% on 1L). Likely 2/2 PNA vs fluid overload status.  -pt denies hx of CHF, but cardiomegaly with pulmonary congestion on CXR and bibasilar crackles on exam. TTE: systolic EF 57%, with mild increase in R atrial pressure. No pericardial effusion.  -after starting fluids yesterday, pt has more labored breathing with wheezing and bibasilar crackles. CXR showed fluid overload. Given 20mg lasix with symptomatic improvement  -d/c'ed fluids due to resp status and resolving HERI    Plan:  -target O2 sat >94%  -tx PNA as discussed above  -cont duonebs  -strict Is+Os  -monitor resp status and repeat CXR to assess fluid overload if worsens as pt restarting fluids

## 2020-08-23 LAB
ALBUMIN SERPL ELPH-MCNC: 2.3 G/DL — LOW (ref 3.3–5)
ALP SERPL-CCNC: 71 U/L — SIGNIFICANT CHANGE UP (ref 40–120)
ALT FLD-CCNC: 152 U/L — HIGH (ref 10–45)
ANION GAP SERPL CALC-SCNC: 10 MMOL/L — SIGNIFICANT CHANGE UP (ref 5–17)
AST SERPL-CCNC: 333 U/L — HIGH (ref 10–40)
BILIRUB SERPL-MCNC: 0.3 MG/DL — SIGNIFICANT CHANGE UP (ref 0.2–1.2)
BUN SERPL-MCNC: 26 MG/DL — HIGH (ref 7–23)
CALCIUM SERPL-MCNC: 8.3 MG/DL — LOW (ref 8.4–10.5)
CHLORIDE SERPL-SCNC: 104 MMOL/L — SIGNIFICANT CHANGE UP (ref 96–108)
CK SERPL-CCNC: 3662 U/L — HIGH (ref 25–170)
CO2 SERPL-SCNC: 27 MMOL/L — SIGNIFICANT CHANGE UP (ref 22–31)
CREAT SERPL-MCNC: 1.07 MG/DL — SIGNIFICANT CHANGE UP (ref 0.5–1.3)
GLUCOSE BLDC GLUCOMTR-MCNC: 109 MG/DL — HIGH (ref 70–99)
GLUCOSE BLDC GLUCOMTR-MCNC: 125 MG/DL — HIGH (ref 70–99)
GLUCOSE BLDC GLUCOMTR-MCNC: 145 MG/DL — HIGH (ref 70–99)
GLUCOSE BLDC GLUCOMTR-MCNC: 180 MG/DL — HIGH (ref 70–99)
GLUCOSE SERPL-MCNC: 115 MG/DL — HIGH (ref 70–99)
HCT VFR BLD CALC: 33.4 % — LOW (ref 34.5–45)
HGB BLD-MCNC: 11.4 G/DL — LOW (ref 11.5–15.5)
MAGNESIUM SERPL-MCNC: 2.3 MG/DL — SIGNIFICANT CHANGE UP (ref 1.6–2.6)
MCHC RBC-ENTMCNC: 31.1 PG — SIGNIFICANT CHANGE UP (ref 27–34)
MCHC RBC-ENTMCNC: 34.1 GM/DL — SIGNIFICANT CHANGE UP (ref 32–36)
MCV RBC AUTO: 91.3 FL — SIGNIFICANT CHANGE UP (ref 80–100)
NRBC # BLD: 0 /100 WBCS — SIGNIFICANT CHANGE UP (ref 0–0)
PHOSPHATE SERPL-MCNC: 2.6 MG/DL — SIGNIFICANT CHANGE UP (ref 2.5–4.5)
PLATELET # BLD AUTO: 378 K/UL — SIGNIFICANT CHANGE UP (ref 150–400)
POTASSIUM SERPL-MCNC: 3.8 MMOL/L — SIGNIFICANT CHANGE UP (ref 3.5–5.3)
POTASSIUM SERPL-SCNC: 3.8 MMOL/L — SIGNIFICANT CHANGE UP (ref 3.5–5.3)
PROT SERPL-MCNC: 6.1 G/DL — SIGNIFICANT CHANGE UP (ref 6–8.3)
RBC # BLD: 3.66 M/UL — LOW (ref 3.8–5.2)
RBC # FLD: 15.2 % — HIGH (ref 10.3–14.5)
SODIUM SERPL-SCNC: 141 MMOL/L — SIGNIFICANT CHANGE UP (ref 135–145)
WBC # BLD: 8.84 K/UL — SIGNIFICANT CHANGE UP (ref 3.8–10.5)
WBC # FLD AUTO: 8.84 K/UL — SIGNIFICANT CHANGE UP (ref 3.8–10.5)

## 2020-08-23 PROCEDURE — 71045 X-RAY EXAM CHEST 1 VIEW: CPT | Mod: 26

## 2020-08-23 PROCEDURE — 99233 SBSQ HOSP IP/OBS HIGH 50: CPT | Mod: GC

## 2020-08-23 RX ORDER — POTASSIUM CHLORIDE 20 MEQ
20 PACKET (EA) ORAL ONCE
Refills: 0 | Status: COMPLETED | OUTPATIENT
Start: 2020-08-23 | End: 2020-08-23

## 2020-08-23 RX ADMIN — POLYETHYLENE GLYCOL 3350 17 GRAM(S): 17 POWDER, FOR SOLUTION ORAL at 12:37

## 2020-08-23 RX ADMIN — ENOXAPARIN SODIUM 30 MILLIGRAM(S): 100 INJECTION SUBCUTANEOUS at 12:37

## 2020-08-23 RX ADMIN — AZITHROMYCIN 255 MILLIGRAM(S): 500 TABLET, FILM COATED ORAL at 18:40

## 2020-08-23 RX ADMIN — CEFTRIAXONE 100 MILLIGRAM(S): 500 INJECTION, POWDER, FOR SOLUTION INTRAMUSCULAR; INTRAVENOUS at 22:08

## 2020-08-23 RX ADMIN — SENNA PLUS 2 TABLET(S): 8.6 TABLET ORAL at 22:09

## 2020-08-23 RX ADMIN — Medication 20 MILLIEQUIVALENT(S): at 08:55

## 2020-08-23 NOTE — PROGRESS NOTE ADULT - SUBJECTIVE AND OBJECTIVE BOX
Pt seen and examined at bedside, reports her breathing is improved, feeling better overall    Vital Signs Last 24 Hrs  T(C): 36.4 (23 Aug 2020 14:18), Max: 37 (22 Aug 2020 21:30)  T(F): 97.6 (23 Aug 2020 14:18), Max: 98.6 (22 Aug 2020 21:30)  HR: 78 (23 Aug 2020 14:18) (78 - 87)  BP: 147/83 (23 Aug 2020 14:18) (145/86 - 147/83)  BP(mean): --  RR: 19 (23 Aug 2020 14:18) (19 - 20)  SpO2: 95% (23 Aug 2020 14:18) (94% - 95%)    AA and O x 3 NAD  NCAT  neck supple  s1s2 RRR, no murmurs  lungs b/l scattered crackles  abd soft NTND, BS present x 4 quadrants  ext no edema  no focal deficits  skin w/w/p    Comprehensive Metabolic Panel in AM (08.23.20 @ 07:46)    Sodium, Serum: 141 mmol/L    Potassium, Serum: 3.8 mmol/L    Chloride, Serum: 104 mmol/L    Carbon Dioxide, Serum: 27 mmol/L    Anion Gap, Serum: 10 mmol/L    Blood Urea Nitrogen, Serum: 26 mg/dL    Creatinine, Serum: 1.07 mg/dL    Glucose, Serum: 115 mg/dL    Calcium, Total Serum: 8.3 mg/dL    Protein Total, Serum: 6.1 g/dL    Albumin, Serum: 2.3 g/dL    Bilirubin Total, Serum: 0.3 mg/dL    Alkaline Phosphatase, Serum: 71 U/L    Aspartate Aminotransferase (AST/SGOT): 333 U/L    Alanine Aminotransferase (ALT/SGPT): 152 U/L    eGFR if Non : 54: The units for eGFR are ml/min/1.73m2 (normalized body surface area). The  eGFR is calculated from a serum creatinine using the CKD-EPI equation.  Other variables required for calculation are race, age and sex. Among  patients with chronic kidney disease (CKD), the eGFR is useful in  determining the stage of disease according to KDOQI CKD classification.  All eGFR results are reported numerically with the following  interpretation.          GFR                    With                        Without     (ml/min/1.73 m2)    Kidney Damage       Kidney Damage        >= 90                    Stage 1                     Normal        60-89                    Stage 2                     Decreased GFR        30-59                    Stage 3         Stage 3        15-29                    Stage 4                     Stage 4        < 15                      Stage 5                     Stage 5  Each stage of CKD assumes that the associated GFR level has been in  effect for at least 3 months. Determination of stages one and two (with  eGFR > 59 ml/min/m2) requires estimation of kidney damage for at least 3  months as defined by structural or functional abnormalities.  Limitations: All estimates of GFR will be less accurate for patientsat  extremes of muscle mass (including but not limited to frail elderly,  critically ill, or cancer patients), those with unusual diets, and those  with conditions associated with reduced secretion or extrarenal  elimination of creatinine. The eGFR equation is not recommended for use  in patients with unstable creatinine levels. mL/min/1.73M2    eGFR if : 62: The units for eGFR are ml/min/1.73m2 (normalized body surface area). The  eGFR is calculated from a serum creatinine using the CKD-EPI equation.  Other variables required for calculation are race, age and sex. Among  patients with chronic kidney disease (CKD), the eGFR is useful in  determining the stage of disease according to KDOQI CKD classification.  All eGFR results are reported numerically with the following  interpretation.          GFR                    With                        Without     (ml/min/1.73 m2)    Kidney Damage       Kidney Damage        >= 90                    Stage 1                     Normal        60-89                    Stage 2                     Decreased GFR        30-59                    Stage 3         Stage 3        15-29                    Stage 4                     Stage 4        < 15                      Stage 5                     Stage 5  Each stage of CKD assumes that the associated GFR level has been in  effect for at least 3 months. Determination of stages one and two (with  eGFR > 59 ml/min/m2) requires estimation of kidney damage for at least 3  months as defined by structural or functional abnormalities.  Limitations: All estimates of GFR will be less accurate for patientsat  extremes of muscle mass (including but not limited to frail elderly,  critically ill, or cancer patients), those with unusual diets, and those  with conditions associated with reduced secretion or extrarenal  elimination of creatinine. The eGFR equation is not recommended for use  in patients with unstable creatinine levels. mL/min/1.73M2    Complete Blood Count in AM (08.23.20 @ 07:46)    Nucleated RBC: 0 /100 WBCs    WBC Count: 8.84 K/uL    RBC Count: 3.66 M/uL    Hemoglobin: 11.4 g/dL    Hematocrit: 33.4 %    Mean Cell Volume: 91.3 fl    Mean Cell Hemoglobin: 31.1 pg    Mean Cell Hemoglobin Conc: 34.1 gm/dL    Red Cell Distrib Width: 15.2 %    Platelet Count - Automated: 378 K/uL

## 2020-08-23 NOTE — PROGRESS NOTE ADULT - ATTENDING COMMENTS
66 yo female with no sig PMHX BIBEMS after found down, found to have sepsis 2/2 PNA, course c/b fluid overload and likely rhabdo    1) hypoxic respiratory failure - likely 2/2 PNA with component fluid overload   -improved infiltrates and pleural effusions on CXR this morning  -respiratory status now improving  -will hold fluids for tonight and monitor respiratory status, encourage PO intake  -c/w ceftriaxone, azithro   -consider broaden coverage to vanc/zosyn if clinically worsens    2) rhabdo - now s/p fluids, CKs downtrending and HERI resolved  will hold fluids for now and monitor CKs, Cr, encourage PO intake    3) incidental cord compression - denies weakness  per spine - f/u MRI    4) DM2 - chronic, stable, FS and ISS
64 yo female with no sig PMHX BIBEMS after found down, found to have sepsis 2/2 PNA, course c/b fluid overload and likely rhabdo    1) hypoxic respiratory failure - likely 2/2 PNA with component fluid overload, now improving clinically  -continue to hold fluids for tonight and monitor respiratory status, encourage PO intake  -c/w ceftriaxone, azithro   -consider broaden coverage to vanc/zosyn if clinically worsens  - wean O2    2) rhabdo - now s/p fluids, CKs downtrending and HERI resolved  will hold fluids for now and monitor CKs, Cr, encourage PO intake    3) incidental cord compression - denies weakness  per spine - f/u MRI    4) DM2 - chronic, stable, FS and ISS    5) elevated LFTs - check RUQ sono
I have personally seen, examined, and participated in the care of this patient. I have reviewed all pertinent clinical information including history, physical exam, plan, and the resident's note and agree except as noted    65F who reports no significant PMHx, BIBEMS after being found down, admitted for sepsis 2/2 LLL pneumonia   -PNA: Pt with LLB infiltrate, acute hypoxic resp failure requiring oxygen likely 2/2 PNA. Pt with fever to 103 overnight, therefore will obtain RVP and CT chest, if patient with recurrent fever will broaden from CTX/azithro to vanc/zosyn.   -Rhabdo: Pt with slight improvement in Cr however increasing CK and LFTs, likely rhabdo in setting of immobility, will start maintenance fluids and monitor with frequent lung checks, trend CK in evening may need to increase fluids, frequent lung checks  Otherwise plan as stated above

## 2020-08-23 NOTE — PROGRESS NOTE ADULT - PROBLEM SELECTOR PLAN 1
Treating for CAP as pt has been home with no recent hospitalizations  -Pt presented w sepsis (Tmax 100.8, , RR 20), AMS, productive cough and LLL opacity on CXR c/w PNA. Received 1 dose of Vanc/Zosyn in ED, 3.7L NS. Now on ceftriaxone and azithromycin for suspected CAP.   -CT: Extensive multifocal bilateral dependent/basilar pneumonia, most severe in the lower lobes, likely secondary to aspiration. Trace left pleural effusion        Plan:  -cont azithromycin (8/19- ) and ceftriaxone (8/19- ), may extend course due to recurrent fever overnight with new Tmax  -Currently afebrile on ceftriaxone and azithromycin. Can consider broadening to cover anaerobes if spikes fever, but at that point would have to treat for HAP with vanc and zosyn  -RVP: negative  -f/u sputum culture: unable to send as pt not making sputum  -f/u blood cultures: NGTD  -trend CBC  -supplemental O2 as needed  -robitussin and tessalon perles PRN for cough

## 2020-08-23 NOTE — PROGRESS NOTE ADULT - SUBJECTIVE AND OBJECTIVE BOX
Ortho Note    Pt comfortable without complaints, pain controlled  Denies CP, SOB, N/V, numbness/tingling     Vital Signs Last 24 Hrs  T(C): 36.6 (08-23-20 @ 06:18), Max: 36.6 (08-23-20 @ 06:18)  T(F): 97.9 (08-23-20 @ 06:18), Max: 97.9 (08-23-20 @ 06:18)  HR: 80 (08-23-20 @ 06:18) (80 - 80)  BP: 146/84 (08-23-20 @ 06:18) (146/84 - 146/84)  BP(mean): --  RR: 19 (08-23-20 @ 06:18) (19 - 19)  SpO2: 95% (08-23-20 @ 06:18) (95% - 95%)  AVSS    Physical Exam:  Gen: well appearing   BL UE sensation intact and symmetric   BL UE motor strength intact and symmetric   2+ rad pulse  No lane sign   no clonus present  down going babnski's                        11.2   8.33  )-----------( 324      ( 22 Aug 2020 08:29 )             32.5   22 Aug 2020 08:29    139    |  104    |  33     ----------------------------<  118    3.7     |  25     |  1.19     Phos  2.6       22 Aug 2020 08:29  Mg     2.4       22 Aug 2020 08:29    TPro  6.2    /  Alb  2.3    /  TBili  0.3    /  DBili  x      /  AST  301    /  ALT  109    /  AlkPhos  65     22 Aug 2020 08:29    A/P: 65F admitted for AMS and workup CT C-spine showing C4-5, C5-6 and C6-7 mild spinal cord compression with no focal deficits     - recommend MRI C, T and L spine to further evaluate  - rest of recs pending MRI results   - Ortho Spine will continue to follow    Ortho Pager [phone] Ortho Note    Pt comfortable without complaints, pain controlled  Denies CP, SOB, N/V, numbness/tingling     Vital Signs Last 24 Hrs  T(C): 36.6 (08-23-20 @ 06:18), Max: 36.6 (08-23-20 @ 06:18)  T(F): 97.9 (08-23-20 @ 06:18), Max: 97.9 (08-23-20 @ 06:18)  HR: 80 (08-23-20 @ 06:18) (80 - 80)  BP: 146/84 (08-23-20 @ 06:18) (146/84 - 146/84)  BP(mean): --  RR: 19 (08-23-20 @ 06:18) (19 - 19)  SpO2: 95% (08-23-20 @ 06:18) (95% - 95%)  AVSS    Physical Exam:  Gen: well appearing   BL UE sensation intact and symmetric   BL UE motor strength intact and symmetric   2+ rad pulse  No lane sign   no clonus present  down going babnski's                        11.2   8.33  )-----------( 324      ( 22 Aug 2020 08:29 )             32.5   22 Aug 2020 08:29    139    |  104    |  33     ----------------------------<  118    3.7     |  25     |  1.19     Phos  2.6       22 Aug 2020 08:29  Mg     2.4       22 Aug 2020 08:29    TPro  6.2    /  Alb  2.3    /  TBili  0.3    /  DBili  x      /  AST  301    /  ALT  109    /  AlkPhos  65     22 Aug 2020 08:29    A/P: 65F admitted for AMS and workup CT C-spine showing C4-5, C5-6 and C6-7 mild spinal cord compression with no focal deficits     - MR of C and L spine done, showing stenosis in the cervical region however no current weakness clinically or sensory symptoms.  - ortho to continue to follow while patient in house  - Ortho Spine will continue to follow    Ortho Pager [phone]

## 2020-08-23 NOTE — PROGRESS NOTE ADULT - PROBLEM SELECTOR PLAN 10
F: caution with fluids given overload status. restarted at 125cc/hr as concern for rhabdo with documented EF:57%  E: replete PRN  N: DM diet  DVT ppx: renally dosed lovenox  GI ppx: not indicated    Code Status: Full Code  Dispo: RM Well-developed, well nourished

## 2020-08-24 LAB
ALBUMIN SERPL ELPH-MCNC: 2.5 G/DL — LOW (ref 3.3–5)
ALP SERPL-CCNC: 71 U/L — SIGNIFICANT CHANGE UP (ref 40–120)
ALT FLD-CCNC: 150 U/L — HIGH (ref 10–45)
ANION GAP SERPL CALC-SCNC: 11 MMOL/L — SIGNIFICANT CHANGE UP (ref 5–17)
AST SERPL-CCNC: 228 U/L — HIGH (ref 10–40)
BILIRUB SERPL-MCNC: 0.3 MG/DL — SIGNIFICANT CHANGE UP (ref 0.2–1.2)
BUN SERPL-MCNC: 19 MG/DL — SIGNIFICANT CHANGE UP (ref 7–23)
CALCIUM SERPL-MCNC: 8.3 MG/DL — LOW (ref 8.4–10.5)
CHLORIDE SERPL-SCNC: 102 MMOL/L — SIGNIFICANT CHANGE UP (ref 96–108)
CK SERPL-CCNC: 754 U/L — HIGH (ref 25–170)
CO2 SERPL-SCNC: 28 MMOL/L — SIGNIFICANT CHANGE UP (ref 22–31)
CREAT SERPL-MCNC: 0.92 MG/DL — SIGNIFICANT CHANGE UP (ref 0.5–1.3)
CULTURE RESULTS: SIGNIFICANT CHANGE UP
CULTURE RESULTS: SIGNIFICANT CHANGE UP
GLUCOSE BLDC GLUCOMTR-MCNC: 103 MG/DL — HIGH (ref 70–99)
GLUCOSE BLDC GLUCOMTR-MCNC: 112 MG/DL — HIGH (ref 70–99)
GLUCOSE BLDC GLUCOMTR-MCNC: 157 MG/DL — HIGH (ref 70–99)
GLUCOSE BLDC GLUCOMTR-MCNC: 174 MG/DL — HIGH (ref 70–99)
GLUCOSE SERPL-MCNC: 153 MG/DL — HIGH (ref 70–99)
HCT VFR BLD CALC: 34.6 % — SIGNIFICANT CHANGE UP (ref 34.5–45)
HGB BLD-MCNC: 11.1 G/DL — LOW (ref 11.5–15.5)
MAGNESIUM SERPL-MCNC: 2.3 MG/DL — SIGNIFICANT CHANGE UP (ref 1.6–2.6)
MCHC RBC-ENTMCNC: 28.5 PG — SIGNIFICANT CHANGE UP (ref 27–34)
MCHC RBC-ENTMCNC: 32.1 GM/DL — SIGNIFICANT CHANGE UP (ref 32–36)
MCV RBC AUTO: 88.7 FL — SIGNIFICANT CHANGE UP (ref 80–100)
NRBC # BLD: 0 /100 WBCS — SIGNIFICANT CHANGE UP (ref 0–0)
PHOSPHATE SERPL-MCNC: 3.4 MG/DL — SIGNIFICANT CHANGE UP (ref 2.5–4.5)
PLATELET # BLD AUTO: 413 K/UL — HIGH (ref 150–400)
POTASSIUM SERPL-MCNC: 3.5 MMOL/L — SIGNIFICANT CHANGE UP (ref 3.5–5.3)
POTASSIUM SERPL-SCNC: 3.5 MMOL/L — SIGNIFICANT CHANGE UP (ref 3.5–5.3)
PROT SERPL-MCNC: 6.3 G/DL — SIGNIFICANT CHANGE UP (ref 6–8.3)
RBC # BLD: 3.9 M/UL — SIGNIFICANT CHANGE UP (ref 3.8–5.2)
RBC # FLD: 14.6 % — HIGH (ref 10.3–14.5)
SODIUM SERPL-SCNC: 141 MMOL/L — SIGNIFICANT CHANGE UP (ref 135–145)
SPECIMEN SOURCE: SIGNIFICANT CHANGE UP
SPECIMEN SOURCE: SIGNIFICANT CHANGE UP
WBC # BLD: 7.9 K/UL — SIGNIFICANT CHANGE UP (ref 3.8–10.5)
WBC # FLD AUTO: 7.9 K/UL — SIGNIFICANT CHANGE UP (ref 3.8–10.5)

## 2020-08-24 PROCEDURE — 99233 SBSQ HOSP IP/OBS HIGH 50: CPT | Mod: GC

## 2020-08-24 PROCEDURE — 76705 ECHO EXAM OF ABDOMEN: CPT | Mod: 26

## 2020-08-24 PROCEDURE — 99221 1ST HOSP IP/OBS SF/LOW 40: CPT

## 2020-08-24 RX ORDER — POTASSIUM CHLORIDE 20 MEQ
40 PACKET (EA) ORAL ONCE
Refills: 0 | Status: COMPLETED | OUTPATIENT
Start: 2020-08-24 | End: 2020-08-24

## 2020-08-24 RX ADMIN — Medication 2: at 17:21

## 2020-08-24 RX ADMIN — Medication 5 MILLIGRAM(S): at 07:27

## 2020-08-24 RX ADMIN — Medication 2: at 12:04

## 2020-08-24 RX ADMIN — ENOXAPARIN SODIUM 30 MILLIGRAM(S): 100 INJECTION SUBCUTANEOUS at 12:05

## 2020-08-24 RX ADMIN — POLYETHYLENE GLYCOL 3350 17 GRAM(S): 17 POWDER, FOR SOLUTION ORAL at 11:37

## 2020-08-24 RX ADMIN — Medication 40 MILLIEQUIVALENT(S): at 15:43

## 2020-08-24 NOTE — PROGRESS NOTE ADULT - ASSESSMENT
65F who reports no significant PMHx, BIBEMS after being found down at home for ~3days, complaining of confusion, cough and back pain, admitted for sepsis 2/2 pneumonia s/p fluids in ED with possible fluid overload c/b acute respiratory hypoxemia requiring oxygen supplementation and rhabdomyolysis.

## 2020-08-24 NOTE — PROGRESS NOTE ADULT - SUBJECTIVE AND OBJECTIVE BOX
CC: Patient is a 65y old  Female who presents with a chief complaint of AMS, cough (24 Aug 2020 13:41)      OVERNIGHT EVENTS: MARCELA    SUBJECTIVE / INTERVAL HPI: Patient seen and examined at bedside. Pt denies fevers, chills, chest pain, shortness of breath, cough, abdominal pain, nausea, vomiting, diarrhea, leg pain/swelling. Pt reports that she has not had a bowel movement in several days.    ROS: negative unless otherwise stated above.    VITAL SIGNS:  Vital Signs Last 24 Hrs  T(C): 36.9 (24 Aug 2020 06:11), Max: 37 (24 Aug 2020 00:21)  T(F): 98.5 (24 Aug 2020 06:11), Max: 98.6 (24 Aug 2020 00:21)  HR: 87 (24 Aug 2020 06:11) (87 - 93)  BP: 138/82 (24 Aug 2020 06:11) (137/81 - 138/82)  BP(mean): --  RR: 20 (24 Aug 2020 06:11) (18 - 20)  SpO2: 96% (24 Aug 2020 06:11) (91% - 96%)    PHYSICAL EXAM:    General: WDWN, obese, NAD  HEENT: NC/AT; MMM  Neck: supple  Cardiovascular: +S1/S2; RRR  Respiratory: CTA B/L; no W/R/R  Gastrointestinal: soft, NT/ND; +BSx4  Extremities: WWP; no edema, clubbing or cyanosis  Vascular: 2+ radial, DP/PT pulses B/L  Neurological: AAOx3; no focal deficits    MEDICATIONS:  MEDICATIONS  (STANDING):  dextrose 5%. 1000 milliLiter(s) (50 mL/Hr) IV Continuous <Continuous>  dextrose 50% Injectable 12.5 Gram(s) IV Push once  dextrose 50% Injectable 25 Gram(s) IV Push once  dextrose 50% Injectable 25 Gram(s) IV Push once  enoxaparin Injectable 30 milliGRAM(s) SubCutaneous every 24 hours  insulin lispro (HumaLOG) corrective regimen sliding scale   SubCutaneous three times a day before meals  insulin lispro (HumaLOG) corrective regimen sliding scale   SubCutaneous at bedtime  polyethylene glycol 3350 17 Gram(s) Oral daily  potassium chloride    Tablet ER 40 milliEquivalent(s) Oral once  senna 2 Tablet(s) Oral at bedtime    MEDICATIONS  (PRN):  acetaminophen   Tablet .. 650 milliGRAM(s) Oral every 4 hours PRN Mild Pain (1 - 3)  acetaminophen   Tablet .. 650 milliGRAM(s) Oral once PRN Temp greater or equal to 38C (100.4F)  benzonatate 100 milliGRAM(s) Oral three times a day PRN Cough  bisacodyl 5 milliGRAM(s) Oral every 12 hours PRN Constipation  dextrose 40% Gel 15 Gram(s) Oral once PRN Blood Glucose LESS THAN 70 milliGRAM(s)/deciliter  glucagon  Injectable 1 milliGRAM(s) IntraMuscular once PRN Glucose LESS THAN 70 milligrams/deciliter  guaifenesin/dextromethorphan  Syrup 10 milliLiter(s) Oral three times a day PRN Cough      ALLERGIES:  Allergies    No Known Allergies    Intolerances        LABS:                        11.1   7.90  )-----------( 413      ( 24 Aug 2020 12:54 )             34.6     08-24    141  |  102  |  19  ----------------------------<  153<H>  3.5   |  28  |  0.92    Ca    8.3<L>      24 Aug 2020 12:54  Phos  3.4     08-24  Mg     2.3     08-24    TPro  6.3  /  Alb  2.5<L>  /  TBili  0.3  /  DBili  x   /  AST  228<H>  /  ALT  150<H>  /  AlkPhos  71  08-24        CAPILLARY BLOOD GLUCOSE      POCT Blood Glucose.: 157 mg/dL (24 Aug 2020 11:51)      RADIOLOGY & ADDITIONAL TESTS: Reviewed.

## 2020-08-24 NOTE — PROGRESS NOTE ADULT - PROBLEM SELECTOR PLAN 2
Pt not on oxygen at home, now requiring 2-3L NC (stating 93-94% on 3L, 88% on 1L). Likely 2/2 PNA vs fluid overload status.  -pt denies hx of CHF, but cardiomegaly with pulmonary congestion on CXR and bibasilar crackles on exam. TTE: systolic EF 57%, with mild increase in R atrial pressure. No pericardial effusion.  -d/c'ed fluids due to resp status and resolving HERI     Plan:  -target O2 sat >94%  -s/p PNA tx as discussed above  -cont duonebs  -strict Is+Os  -monitor resp status and repeat CXR to assess fluid overload if worsens

## 2020-08-24 NOTE — PROGRESS NOTE ADULT - PROBLEM SELECTOR PLAN 4
Uptrending AST/ALT: 309/101 from 195/77  -still rising in the setting of resolving kidney function and CK  -neg hep panel, drug tox and acetaminophen levels  -pt denies alcohol use  -possibly AREVALO    Plan:  -trend LFTs  -possible GI consult    #Incidental cord compression  -not currently c/o weakness or sensory deficits  Plan:  -per spinal service recs - ordered MR of spine

## 2020-08-24 NOTE — PROGRESS NOTE ADULT - SUBJECTIVE AND OBJECTIVE BOX
Patient was seen and examined by me at bedside. I agree with resident's note, subjective, objective physical exam, assessment and plan with following modifications/additions.    Greater than 35 minutes spent on total encounter; more than 50% of the visit was spent counseling and/or coordinating care by the attending physician.    S: reports feeling sig improved since last week, remains with shortness of breath kylee with exertion, minimal cough    O: VS-afeb, HD stable, on 4-5L 02, Aox3 lungs decreased BS at bases b/l, CV RRR, abd soft, nt ext wwp no sig le edema. Labs/imaging reviewed    A/P: 65F who reports no significant PMHx brought in found down admitted for sepsis 2/2 LLL pneumonia with likely ATN c/b mild volume overload as well as rhabdo, all slowly resolving, per chest film remains with mild fluid overload   -Diurese off with 20IV lasix today   -Completed CAP therapy for 5 days with sig improvement WBC, fever, clinical symptoms, no further rx needed  -Mild transaminitis may be from volume overload, diurese, would obtain RUQ US today  -D/c olmos today, TOV  -Remains sig weak, plan for MICHELE pending acceptance    Shahbaz Haile MD [phone]

## 2020-08-24 NOTE — PROGRESS NOTE ADULT - PROBLEM SELECTOR PLAN 1
Treating for CAP as pt has been home with no recent hospitalizations  -Pt presented w sepsis (Tmax 100.8, , RR 20), AMS, productive cough and LLL opacity on CXR c/w PNA. Received 1 dose of Vanc/Zosyn in ED, 3.7L NS. Now on ceftriaxone and azithromycin for suspected CAP.   -CT: Extensive multifocal bilateral dependent/basilar pneumonia, most severe in the lower lobes, likely secondary to aspiration. Trace left pleural effusion        Plan:  -s/p azithromycin (8/19-8/23) and ceftriaxone (8/19-8/23),   -Currently afebrile   -RVP: negative  -f/u sputum culture: unable to send as pt not making sputum  -f/u blood cultures: NGTD  -trend CBC  -supplemental O2 as needed  -robitussin and tessalon perles PRN for cough

## 2020-08-24 NOTE — CONSULT NOTE ADULT - SUBJECTIVE AND OBJECTIVE BOX
Seen and examined this evening.    No pain in neck.  no radicular complaints.  No numbness tingling in extremities.  no weakness.  denies history of balance difficulties or hand dexterity problems.  CT done as part of work up in ED, cervical stenosis incidentally found.    PE:  NAD  5/5 C5-T1 b/l  SILT C5-T1 b/l  normoreflexive upper and lower, downgoing babinski bilaterally, no clonus    MRI/CT: Cervical canal stenosis C4-6 moderate to severe with mild cord flattening, no signal change seen.    A/P: cervical canal stenosis found incidnetally.  no myelopathic signs or symptoms.  would observe at this time.  recommend MRI in 6 months c spine, non contrast, can follow up with me to obtain this.  discussed increased risk of central cord syndrome with future trauma, patient understands.  red flag signs for progression of spinal cord compression discussed iwth patient.  will follow up with me immediately if this occur.  all questions answered.  reconsult PRN.

## 2020-08-24 NOTE — PROGRESS NOTE ADULT - PROBLEM SELECTOR PLAN 6
-IN ED, SIRS criteria met: Tmax 100.8, , RR 20  -likely PNA given LLL opacity on CXR with new onset productive cough and fevers  -UA negative  -s/p 3.7L NS, vanc, zosyn in ED    Plan:  -s/p CAP tx as discussed above

## 2020-08-25 ENCOUNTER — TRANSCRIPTION ENCOUNTER (OUTPATIENT)
Age: 65
End: 2020-08-25

## 2020-08-25 LAB
ALBUMIN SERPL ELPH-MCNC: 2.3 G/DL — LOW (ref 3.3–5)
ALP SERPL-CCNC: 70 U/L — SIGNIFICANT CHANGE UP (ref 40–120)
ALT FLD-CCNC: 197 U/L — HIGH (ref 10–45)
ANION GAP SERPL CALC-SCNC: 13 MMOL/L — SIGNIFICANT CHANGE UP (ref 5–17)
AST SERPL-CCNC: 285 U/L — HIGH (ref 10–40)
BASOPHILS # BLD AUTO: 0.03 K/UL — SIGNIFICANT CHANGE UP (ref 0–0.2)
BASOPHILS NFR BLD AUTO: 0.4 % — SIGNIFICANT CHANGE UP (ref 0–2)
BILIRUB SERPL-MCNC: 0.4 MG/DL — SIGNIFICANT CHANGE UP (ref 0.2–1.2)
BUN SERPL-MCNC: 18 MG/DL — SIGNIFICANT CHANGE UP (ref 7–23)
CALCIUM SERPL-MCNC: 8.5 MG/DL — SIGNIFICANT CHANGE UP (ref 8.4–10.5)
CHLORIDE SERPL-SCNC: 103 MMOL/L — SIGNIFICANT CHANGE UP (ref 96–108)
CK SERPL-CCNC: 530 U/L — HIGH (ref 25–170)
CO2 SERPL-SCNC: 28 MMOL/L — SIGNIFICANT CHANGE UP (ref 22–31)
CREAT SERPL-MCNC: 0.94 MG/DL — SIGNIFICANT CHANGE UP (ref 0.5–1.3)
CULTURE RESULTS: SIGNIFICANT CHANGE UP
EOSINOPHIL # BLD AUTO: 0.31 K/UL — SIGNIFICANT CHANGE UP (ref 0–0.5)
EOSINOPHIL NFR BLD AUTO: 4 % — SIGNIFICANT CHANGE UP (ref 0–6)
GLUCOSE BLDC GLUCOMTR-MCNC: 109 MG/DL — HIGH (ref 70–99)
GLUCOSE BLDC GLUCOMTR-MCNC: 111 MG/DL — HIGH (ref 70–99)
GLUCOSE BLDC GLUCOMTR-MCNC: 149 MG/DL — HIGH (ref 70–99)
GLUCOSE BLDC GLUCOMTR-MCNC: 208 MG/DL — HIGH (ref 70–99)
GLUCOSE SERPL-MCNC: 116 MG/DL — HIGH (ref 70–99)
HCT VFR BLD CALC: 36.8 % — SIGNIFICANT CHANGE UP (ref 34.5–45)
HGB BLD-MCNC: 11.9 G/DL — SIGNIFICANT CHANGE UP (ref 11.5–15.5)
IMM GRANULOCYTES NFR BLD AUTO: 6.1 % — HIGH (ref 0–1.5)
LYMPHOCYTES # BLD AUTO: 0.91 K/UL — LOW (ref 1–3.3)
LYMPHOCYTES # BLD AUTO: 11.8 % — LOW (ref 13–44)
MAGNESIUM SERPL-MCNC: 2.2 MG/DL — SIGNIFICANT CHANGE UP (ref 1.6–2.6)
MCHC RBC-ENTMCNC: 28.7 PG — SIGNIFICANT CHANGE UP (ref 27–34)
MCHC RBC-ENTMCNC: 32.3 GM/DL — SIGNIFICANT CHANGE UP (ref 32–36)
MCV RBC AUTO: 88.7 FL — SIGNIFICANT CHANGE UP (ref 80–100)
MONOCYTES # BLD AUTO: 0.82 K/UL — SIGNIFICANT CHANGE UP (ref 0–0.9)
MONOCYTES NFR BLD AUTO: 10.6 % — SIGNIFICANT CHANGE UP (ref 2–14)
NEUTROPHILS # BLD AUTO: 5.2 K/UL — SIGNIFICANT CHANGE UP (ref 1.8–7.4)
NEUTROPHILS NFR BLD AUTO: 67.1 % — SIGNIFICANT CHANGE UP (ref 43–77)
NRBC # BLD: 0 /100 WBCS — SIGNIFICANT CHANGE UP (ref 0–0)
PHOSPHATE SERPL-MCNC: 3.6 MG/DL — SIGNIFICANT CHANGE UP (ref 2.5–4.5)
PLATELET # BLD AUTO: 550 K/UL — HIGH (ref 150–400)
POTASSIUM SERPL-MCNC: 3.6 MMOL/L — SIGNIFICANT CHANGE UP (ref 3.5–5.3)
POTASSIUM SERPL-SCNC: 3.6 MMOL/L — SIGNIFICANT CHANGE UP (ref 3.5–5.3)
PROT SERPL-MCNC: 6.6 G/DL — SIGNIFICANT CHANGE UP (ref 6–8.3)
RBC # BLD: 4.15 M/UL — SIGNIFICANT CHANGE UP (ref 3.8–5.2)
RBC # FLD: 14.5 % — SIGNIFICANT CHANGE UP (ref 10.3–14.5)
SODIUM SERPL-SCNC: 144 MMOL/L — SIGNIFICANT CHANGE UP (ref 135–145)
SPECIMEN SOURCE: SIGNIFICANT CHANGE UP
WBC # BLD: 7.74 K/UL — SIGNIFICANT CHANGE UP (ref 3.8–10.5)
WBC # FLD AUTO: 7.74 K/UL — SIGNIFICANT CHANGE UP (ref 3.8–10.5)

## 2020-08-25 PROCEDURE — 99233 SBSQ HOSP IP/OBS HIGH 50: CPT | Mod: GC

## 2020-08-25 RX ORDER — FUROSEMIDE 40 MG
20 TABLET ORAL ONCE
Refills: 0 | Status: COMPLETED | OUTPATIENT
Start: 2020-08-25 | End: 2020-08-25

## 2020-08-25 RX ADMIN — Medication 4: at 17:38

## 2020-08-25 RX ADMIN — ENOXAPARIN SODIUM 30 MILLIGRAM(S): 100 INJECTION SUBCUTANEOUS at 11:20

## 2020-08-25 RX ADMIN — Medication 20 MILLIGRAM(S): at 09:07

## 2020-08-25 RX ADMIN — SENNA PLUS 2 TABLET(S): 8.6 TABLET ORAL at 21:47

## 2020-08-25 NOTE — DISCHARGE NOTE PROVIDER - HOSPITAL COURSE
#Discharge: do not delete        Patient is __ yo M/F with past medical history of _____    Presented with _____, found to have _____    Problem List/Main Diagnoses (system-based):     Inpatient treatment course:     New medications:     Labs to be followed outpatient:     Exam to be followed outpatient: #Discharge: do not delete        65F who reports no significant PMHx, BIBEMS after being found down at home for ~3days, complaining of confusion, cough and back pain, admitted for sepsis 2/2 pneumonia s/p fluids in ED with possible fluid overload c/b acute respiratory hypoxemia requiring oxygen supplementation and rhabdomyolysis.        Problem List/Main Diagnoses (system-based):     #Community Acquired Pneumonia    -Pt presented w sepsis, AMS, productive cough and LLL opacity on CXR c/w PNA.     -Received 1 dose of Vanc/Zosyn in ED, 3.7L NS.      --s/p azithromycin (8/19-8/23) and ceftriaxone (8/19-8/23)    -CT: Extensive multifocal bilateral dependent/basilar pneumonia, most severe in the lower lobes, likely secondary to aspiration. Trace left pleural effusion    -RVP: negative    -f/u sputum culture: unable to send as pt not making sputum    -f/u blood cultures: NGTD         #Acute respiratory failure with hypoxia.  Plan: Pt not on oxygen at home, requiring up to 5L NC during stay    -pt denies hx of CHF, but cardiomegaly with pulmonary congestion on CXR and bibasilar crackles on exam    -TTE: systolic EF 57%, with mild increase in R atrial pressure. No pericardial effusion.    -resolved with lasix        #Rhabdomyolysis    -Pt found down for ~3days    -HERI    -s/p fluids    -CK downtrending    -Cr downtrending        #Transaminitis    -still rising in the setting of resolving kidney function and CK    -neg hep panel, drug tox and acetaminophen levels    -pt denies alcohol use    -RUQ u/s shows hepatomegaly    -possibly AREVALO    Plan:    -f/u GI as o/p        #Incidental cord compression on CT scan and MRI    -dx of incidental cervical canal stenosis    -not currently c/o weakness or sensory deficits    Plan:    -f/u w/ Dr. Brannon for MRI in 6 months.         New medications: none    Labs to be followed outpatient: LFTs #Discharge: do not delete        65F who reports no significant PMHx, BIBEMS after being found down at home for ~3days, complaining of confusion, cough and back pain, admitted for sepsis 2/2 pneumonia s/p fluids in ED with possible fluid overload c/b acute respiratory hypoxemia requiring oxygen supplementation and rhabdomyolysis.        Problem List/Main Diagnoses (system-based):     #Community Acquired Pneumonia    -Pt presented w sepsis, AMS, productive cough and LLL opacity on CXR c/w PNA.     -Received 1 dose of Vanc/Zosyn in ED, 3.7L NS.      -s/p azithromycin (8/19-8/23) and ceftriaxone (8/19-8/23)    -CT: Extensive multifocal bilateral dependent/basilar pneumonia, most severe in the lower lobes, likely secondary to aspiration. Trace left pleural effusion    -RVP: negative    -f/u sputum culture: unable to send as pt not making sputum    -f/u blood cultures: NGTD         #Acute respiratory failure with hypoxia.  Plan: Pt not on oxygen at home, requiring up to 5L NC during stay    -pt denies hx of CHF, but cardiomegaly with pulmonary congestion on CXR and bibasilar crackles on exam    -TTE: systolic EF 57%, with mild increase in R atrial pressure. No pericardial effusion.    -improving with lasix and kidney function        #Rhabdomyolysis    -Pt found down for ~3days    -HERI now resolved    -s/p fluids    -CK downtrending last 530        #Transaminitis    -still rising in the setting of resolving kidney function and CK    -neg hep panel, drug tox and acetaminophen levels    -pt denies alcohol use    -RUQ u/s shows hepatomegaly    -possibly AREVALO    Plan:    -f/u GI as o/p        #Incidental cord compression on CT scan and MRI    -dx of incidental cervical canal stenosis    -not currently c/o weakness or sensory deficits    Plan:    -f/u w/ Dr. Brannon for MRI in 6 months.         New medications: none    Labs to be followed outpatient: LFTs

## 2020-08-25 NOTE — DISCHARGE NOTE PROVIDER - NSDCCPCAREPLAN_GEN_ALL_CORE_FT
PRINCIPAL DISCHARGE DIAGNOSIS  Diagnosis: Pneumonia  Assessment and Plan of Treatment: Pneumonia is an infection that inflames air sacs in one or both lungs, which may fill with fluid. With pneumonia, the air sacs may fill with fluid or pus. The infection can be life-threatening to infants, children, and people over 65. Symptoms include cough with phlegm or pus, fever, chills, and difficulty breathing. Antibiotics can treat many forms of pneumonia. Some forms of pneumonia can be prevented by vaccines.   Your pneumonia resolved with antibiotics and you were able to breathe a lot better.      SECONDARY DISCHARGE DIAGNOSES  Diagnosis: Cervical spinal stenosis  Assessment and Plan of Treatment: When we were doing CT scans to look at your spine after your fall, we saw that your spinal canal in your neck was a little small. This can sometimes cause weakness, numbness, tinging, or pain. However, you were not complaining of any of these symptoms. We had our orthopedic surgeons see you and they recommended that you follow up with them to get an MRI in 6 months. Please make an appointment with Dr. Brannon as noted in your discharge note.    Diagnosis: Transaminitis  Assessment and Plan of Treatment: While you were in the hospital, we saw that your liver enzymes were elevated. We checked to see if an infection could be causing this, but there was no evidence of infection. We also did an ultrasound of your liver that showed that your liver is bigger than normal. Please follow up with a gastroenterologist to figure out why your liver enzymes are high.    Diagnosis: Respiratory failure  Assessment and Plan of Treatment: While you were in the hospital, you needed oxygen to keep your oxygen saturation at a normal level. At first we thought this was probably due to your pneumonia. However, even after the pneumonia was treated, you still needed the oxygen. We think that because you got fluids, some of these fluids backed up into your lungs. This can happen with people that have a history of heart failure, but we did a sonogram of your heart that looked normal. We gave you a diuretic called furosemide/lasix that helped you pee out all of the fluid in your lungs. Please go to your primary care doctor or to the emergency room if you start to have shortness of breath again.    Diagnosis: Acute renal failure due to rhabdomyolysis  Assessment and Plan of Treatment: Because you were on the ground for about 3 days, your muscles started to break down releasing an enzyme that is toxic to the kidneys. Because of this, your kidney function decreased while you were in the hospital. However, we gave you intravenous fluids to dilute this enzyme in your blood. With the fluids and time, your kidney function returned to normal and you were able to eventually pee out all of this enzyme.    Diagnosis: Dehydration  Assessment and Plan of Treatment:     Diagnosis: Pneumonia  Assessment and Plan of Treatment:

## 2020-08-25 NOTE — PROGRESS NOTE ADULT - SUBJECTIVE AND OBJECTIVE BOX
CC: Patient is a 65y old  Female who presents with a chief complaint of AMS, cough (25 Aug 2020 11:36)      OVERNIGHT EVENTS: MARCELA    SUBJECTIVE / INTERVAL HPI: Patient seen and examined at bedside. Pt denies fevers, chills, chest pain, shortness of breath, cough, abdominal pain, nausea, vomiting, diarrhea, constipation, leg pain/swelling.     ROS: negative unless otherwise stated above.    VITAL SIGNS:  Vital Signs Last 24 Hrs  T(C): 36.5 (25 Aug 2020 13:01), Max: 36.9 (25 Aug 2020 05:11)  T(F): 97.7 (25 Aug 2020 13:01), Max: 98.5 (25 Aug 2020 05:11)  HR: 83 (25 Aug 2020 13:01) (82 - 86)  BP: 129/81 (25 Aug 2020 13:01) (129/81 - 138/81)  BP(mean): --  RR: 18 (25 Aug 2020 13:01) (18 - 19)  SpO2: 95% (25 Aug 2020 13:01) (93% - 96%)    PHYSICAL EXAM:    General: WDWN  HEENT: NC/AT; PERRL, anicteric sclera; MMM  Neck: supple  Cardiovascular: +S1/S2; RRR  Respiratory: normal resp effort, diffuse crackles posteriorly  Gastrointestinal: soft, NT/ND; +BSx4  Extremities: WWP; no edema, clubbing or cyanosis  Vascular: 2+ radial, DP/PT pulses B/L  Neurological: AAOx3; no focal deficits    MEDICATIONS:  MEDICATIONS  (STANDING):  dextrose 5%. 1000 milliLiter(s) (50 mL/Hr) IV Continuous <Continuous>  dextrose 50% Injectable 12.5 Gram(s) IV Push once  dextrose 50% Injectable 25 Gram(s) IV Push once  dextrose 50% Injectable 25 Gram(s) IV Push once  enoxaparin Injectable 30 milliGRAM(s) SubCutaneous every 24 hours  insulin lispro (HumaLOG) corrective regimen sliding scale   SubCutaneous three times a day before meals  insulin lispro (HumaLOG) corrective regimen sliding scale   SubCutaneous at bedtime  polyethylene glycol 3350 17 Gram(s) Oral daily  senna 2 Tablet(s) Oral at bedtime    MEDICATIONS  (PRN):  acetaminophen   Tablet .. 650 milliGRAM(s) Oral every 4 hours PRN Mild Pain (1 - 3)  acetaminophen   Tablet .. 650 milliGRAM(s) Oral once PRN Temp greater or equal to 38C (100.4F)  benzonatate 100 milliGRAM(s) Oral three times a day PRN Cough  bisacodyl 5 milliGRAM(s) Oral every 12 hours PRN Constipation  dextrose 40% Gel 15 Gram(s) Oral once PRN Blood Glucose LESS THAN 70 milliGRAM(s)/deciliter  glucagon  Injectable 1 milliGRAM(s) IntraMuscular once PRN Glucose LESS THAN 70 milligrams/deciliter  guaifenesin/dextromethorphan  Syrup 10 milliLiter(s) Oral three times a day PRN Cough      ALLERGIES:  Allergies    No Known Allergies    Intolerances        LABS:                        11.9   7.74  )-----------( 550      ( 25 Aug 2020 06:04 )             36.8     08-25    144  |  103  |  18  ----------------------------<  116<H>  3.6   |  28  |  0.94    Ca    8.5      25 Aug 2020 06:04  Phos  3.6     08-25  Mg     2.2     08-25    TPro  6.6  /  Alb  2.3<L>  /  TBili  0.4  /  DBili  x   /  AST  285<H>  /  ALT  197<H>  /  AlkPhos  70  08-25        CAPILLARY BLOOD GLUCOSE      POCT Blood Glucose.: 109 mg/dL (25 Aug 2020 11:54)      RADIOLOGY & ADDITIONAL TESTS: Reviewed.

## 2020-08-25 NOTE — PROGRESS NOTE ADULT - PROBLEM SELECTOR PLAN 2
Pt not on oxygen at home, now requiring 2-3L NC (stating 93-94% on 3L, 88% on 1L). Likely 2/2 PNA vs fluid overload status.  -pt denies hx of CHF, but cardiomegaly with pulmonary congestion on CXR and bibasilar crackles on exam. TTE: systolic EF 57%, with mild increase in R atrial pressure. No pericardial effusion.  -d/c'ed fluids due to resp status and resolving HERI     Plan:  -target O2 sat >94%  -s/p PNA tx as discussed above  -cont duonebs  -strict Is+Os  -monitor resp status and repeat CXR to assess fluid overload if worsens  -lasix 20mg today due to crackles on exam and overload on CXR

## 2020-08-25 NOTE — PROGRESS NOTE ADULT - PROBLEM SELECTOR PLAN 4
Uptrending AST/ALT: 309/101 from 195/77  -still rising in the setting of resolving kidney function and CK  -neg hep panel, drug tox and acetaminophen levels  -pt denies alcohol use  -possibly AREVALO    Plan:  -trend LFTs  -possible GI consult    #Incidental cord compression  -not currently c/o weakness or sensory deficits  Plan:  -per spinal service recs - ordered MR of spine  -dx of incidental cervical canal stenosis  -f/u w/ Dr. Brannon for MRI in 6 months

## 2020-08-25 NOTE — DISCHARGE NOTE PROVIDER - NSDCMRMEDTOKEN_GEN_ALL_CORE_FT
bisacodyl 5 mg oral delayed release tablet: 1 tab(s) orally every 12 hours, As needed, Constipation  polyethylene glycol 3350 oral powder for reconstitution: 17 gram(s) orally once a day  senna oral tablet: 2 tab(s) orally once a day (at bedtime)

## 2020-08-25 NOTE — DISCHARGE NOTE PROVIDER - NSDCFUADDAPPT_GEN_ALL_CORE_FT
Please schedule a follow up appointment for your cervical canal stenosis with Dr. Roel Brannon in 6 months for an MRI. Please schedule a follow up appointment for your cervical canal stenosis with Dr. Roel Brannon in 6 months for an MRI.    Please make an appointment for follow up with [facility] at [phone] within 1-2 weeks of discharge from the hospital.

## 2020-08-25 NOTE — DISCHARGE NOTE PROVIDER - CARE PROVIDER_API CALL
Roel Brannon  ORTHOPEDICS  [address] [address]  Phone: [phone]  Fax: [phone]  Follow Up Time:

## 2020-08-25 NOTE — PROGRESS NOTE ADULT - PROBLEM SELECTOR PROBLEM 1
Pneumonia of left lung due to infectious organism, unspecified part of lung

## 2020-08-25 NOTE — PROGRESS NOTE ADULT - SUBJECTIVE AND OBJECTIVE BOX
Patient was seen and examined by me at bedside. I agree with resident's note, subjective, objective physical exam, assessment and plan with following modifications/additions.    Greater than 35 minutes spent on total encounter; more than 50% of the visit was spent counseling and/or coordinating care by the attending physician.    S:  continued improvement in resp status, mild dyspnea with exertion    O: VS-afeb, HD stable, on 2L, Aox3 lungs clear anteriorly, CV RRR, abd soft, nt ext wwp no sig le edema. Labs/imaging reviewed- WBC and creat stable/improved    A/P: 65F who reports no significant PMHx brought in found down admitted for sepsis 2/2 LLL pneumonia with likely ATN c/b mild volume overload as well as rhabdo, all resolving, mild residual fluid overload on lung imaging  -Diurese off with 20IV lasix today, can recheck with POCUS with sig residual effusions needing more diuresis  -Completed CAP therapy for 5 days with sig improvement WBC, fever, clinical symptoms, no further rx needed  -Mild transaminitis may be fatty liver disease, neg RUQ US  -TOV after olmos dc yest passed, without olmos now  -plan for MICHELE later today    Shahbaz Haile MD [phone]

## 2020-08-26 ENCOUNTER — TRANSCRIPTION ENCOUNTER (OUTPATIENT)
Age: 65
End: 2020-08-26

## 2020-08-26 VITALS
TEMPERATURE: 98 F | SYSTOLIC BLOOD PRESSURE: 128 MMHG | RESPIRATION RATE: 20 BRPM | HEART RATE: 86 BPM | OXYGEN SATURATION: 95 % | DIASTOLIC BLOOD PRESSURE: 78 MMHG

## 2020-08-26 PROBLEM — Z78.9 OTHER SPECIFIED HEALTH STATUS: Chronic | Status: ACTIVE | Noted: 2020-08-19

## 2020-08-26 LAB
ANION GAP SERPL CALC-SCNC: 10 MMOL/L — SIGNIFICANT CHANGE UP (ref 5–17)
BUN SERPL-MCNC: 20 MG/DL — SIGNIFICANT CHANGE UP (ref 7–23)
CALCIUM SERPL-MCNC: 8.8 MG/DL — SIGNIFICANT CHANGE UP (ref 8.4–10.5)
CHLORIDE SERPL-SCNC: 99 MMOL/L — SIGNIFICANT CHANGE UP (ref 96–108)
CO2 SERPL-SCNC: 31 MMOL/L — SIGNIFICANT CHANGE UP (ref 22–31)
CREAT SERPL-MCNC: 0.99 MG/DL — SIGNIFICANT CHANGE UP (ref 0.5–1.3)
GLUCOSE BLDC GLUCOMTR-MCNC: 113 MG/DL — HIGH (ref 70–99)
GLUCOSE BLDC GLUCOMTR-MCNC: 121 MG/DL — HIGH (ref 70–99)
GLUCOSE SERPL-MCNC: 126 MG/DL — HIGH (ref 70–99)
HCT VFR BLD CALC: 34.5 % — SIGNIFICANT CHANGE UP (ref 34.5–45)
HGB BLD-MCNC: 12.3 G/DL — SIGNIFICANT CHANGE UP (ref 11.5–15.5)
MCHC RBC-ENTMCNC: 33.4 PG — SIGNIFICANT CHANGE UP (ref 27–34)
MCHC RBC-ENTMCNC: 35.7 GM/DL — SIGNIFICANT CHANGE UP (ref 32–36)
MCV RBC AUTO: 93.8 FL — SIGNIFICANT CHANGE UP (ref 80–100)
NRBC # BLD: 0 /100 WBCS — SIGNIFICANT CHANGE UP (ref 0–0)
PLATELET # BLD AUTO: 561 K/UL — HIGH (ref 150–400)
POTASSIUM SERPL-MCNC: 3.3 MMOL/L — LOW (ref 3.5–5.3)
POTASSIUM SERPL-SCNC: 3.3 MMOL/L — LOW (ref 3.5–5.3)
RBC # BLD: 3.68 M/UL — LOW (ref 3.8–5.2)
RBC # FLD: 14.9 % — HIGH (ref 10.3–14.5)
SODIUM SERPL-SCNC: 140 MMOL/L — SIGNIFICANT CHANGE UP (ref 135–145)
WBC # BLD: 7.81 K/UL — SIGNIFICANT CHANGE UP (ref 3.8–10.5)
WBC # FLD AUTO: 7.81 K/UL — SIGNIFICANT CHANGE UP (ref 3.8–10.5)

## 2020-08-26 PROCEDURE — 84439 ASSAY OF FREE THYROXINE: CPT

## 2020-08-26 PROCEDURE — 85610 PROTHROMBIN TIME: CPT

## 2020-08-26 PROCEDURE — 72131 CT LUMBAR SPINE W/O DYE: CPT

## 2020-08-26 PROCEDURE — 83550 IRON BINDING TEST: CPT

## 2020-08-26 PROCEDURE — 84132 ASSAY OF SERUM POTASSIUM: CPT

## 2020-08-26 PROCEDURE — 80053 COMPREHEN METABOLIC PANEL: CPT

## 2020-08-26 PROCEDURE — 82330 ASSAY OF CALCIUM: CPT

## 2020-08-26 PROCEDURE — 84145 PROCALCITONIN (PCT): CPT

## 2020-08-26 PROCEDURE — 97535 SELF CARE MNGMENT TRAINING: CPT

## 2020-08-26 PROCEDURE — 83605 ASSAY OF LACTIC ACID: CPT

## 2020-08-26 PROCEDURE — 94640 AIRWAY INHALATION TREATMENT: CPT

## 2020-08-26 PROCEDURE — 83540 ASSAY OF IRON: CPT

## 2020-08-26 PROCEDURE — 92610 EVALUATE SWALLOWING FUNCTION: CPT

## 2020-08-26 PROCEDURE — 72148 MRI LUMBAR SPINE W/O DYE: CPT

## 2020-08-26 PROCEDURE — 87040 BLOOD CULTURE FOR BACTERIA: CPT

## 2020-08-26 PROCEDURE — 83735 ASSAY OF MAGNESIUM: CPT

## 2020-08-26 PROCEDURE — 97116 GAIT TRAINING THERAPY: CPT

## 2020-08-26 PROCEDURE — 71250 CT THORAX DX C-: CPT

## 2020-08-26 PROCEDURE — 82803 BLOOD GASES ANY COMBINATION: CPT

## 2020-08-26 PROCEDURE — 84300 ASSAY OF URINE SODIUM: CPT

## 2020-08-26 PROCEDURE — 96368 THER/DIAG CONCURRENT INF: CPT

## 2020-08-26 PROCEDURE — 97161 PT EVAL LOW COMPLEX 20 MIN: CPT

## 2020-08-26 PROCEDURE — 97530 THERAPEUTIC ACTIVITIES: CPT

## 2020-08-26 PROCEDURE — 84295 ASSAY OF SERUM SODIUM: CPT

## 2020-08-26 PROCEDURE — 82550 ASSAY OF CK (CPK): CPT

## 2020-08-26 PROCEDURE — 86901 BLOOD TYPING SEROLOGIC RH(D): CPT

## 2020-08-26 PROCEDURE — 84484 ASSAY OF TROPONIN QUANT: CPT

## 2020-08-26 PROCEDURE — 82962 GLUCOSE BLOOD TEST: CPT

## 2020-08-26 PROCEDURE — 82553 CREATINE MB FRACTION: CPT

## 2020-08-26 PROCEDURE — 97162 PT EVAL MOD COMPLEX 30 MIN: CPT

## 2020-08-26 PROCEDURE — 76705 ECHO EXAM OF ABDOMEN: CPT

## 2020-08-26 PROCEDURE — 93005 ELECTROCARDIOGRAM TRACING: CPT

## 2020-08-26 PROCEDURE — 84100 ASSAY OF PHOSPHORUS: CPT

## 2020-08-26 PROCEDURE — 83036 HEMOGLOBIN GLYCOSYLATED A1C: CPT

## 2020-08-26 PROCEDURE — 81001 URINALYSIS AUTO W/SCOPE: CPT

## 2020-08-26 PROCEDURE — 93306 TTE W/DOPPLER COMPLETE: CPT

## 2020-08-26 PROCEDURE — 87633 RESP VIRUS 12-25 TARGETS: CPT

## 2020-08-26 PROCEDURE — 85730 THROMBOPLASTIN TIME PARTIAL: CPT

## 2020-08-26 PROCEDURE — 84540 ASSAY OF URINE/UREA-N: CPT

## 2020-08-26 PROCEDURE — 85027 COMPLETE CBC AUTOMATED: CPT

## 2020-08-26 PROCEDURE — 86850 RBC ANTIBODY SCREEN: CPT

## 2020-08-26 PROCEDURE — 99239 HOSP IP/OBS DSCHRG MGMT >30: CPT | Mod: GC

## 2020-08-26 PROCEDURE — 83880 ASSAY OF NATRIURETIC PEPTIDE: CPT

## 2020-08-26 PROCEDURE — 80061 LIPID PANEL: CPT

## 2020-08-26 PROCEDURE — 99285 EMERGENCY DEPT VISIT HI MDM: CPT | Mod: 25

## 2020-08-26 PROCEDURE — 80074 ACUTE HEPATITIS PANEL: CPT

## 2020-08-26 PROCEDURE — 96365 THER/PROPH/DIAG IV INF INIT: CPT

## 2020-08-26 PROCEDURE — 71045 X-RAY EXAM CHEST 1 VIEW: CPT

## 2020-08-26 PROCEDURE — 87086 URINE CULTURE/COLONY COUNT: CPT

## 2020-08-26 PROCEDURE — 36415 COLL VENOUS BLD VENIPUNCTURE: CPT

## 2020-08-26 PROCEDURE — 72125 CT NECK SPINE W/O DYE: CPT

## 2020-08-26 PROCEDURE — 80307 DRUG TEST PRSMV CHEM ANLYZR: CPT

## 2020-08-26 PROCEDURE — U0003: CPT

## 2020-08-26 PROCEDURE — 70450 CT HEAD/BRAIN W/O DYE: CPT

## 2020-08-26 PROCEDURE — 82140 ASSAY OF AMMONIA: CPT

## 2020-08-26 PROCEDURE — 80048 BASIC METABOLIC PNL TOTAL CA: CPT

## 2020-08-26 PROCEDURE — 84443 ASSAY THYROID STIM HORMONE: CPT

## 2020-08-26 PROCEDURE — 72141 MRI NECK SPINE W/O DYE: CPT

## 2020-08-26 PROCEDURE — 85025 COMPLETE CBC W/AUTO DIFF WBC: CPT

## 2020-08-26 RX ORDER — FUROSEMIDE 40 MG
20 TABLET ORAL ONCE
Refills: 0 | Status: COMPLETED | OUTPATIENT
Start: 2020-08-26 | End: 2020-08-26

## 2020-08-26 RX ORDER — SENNA PLUS 8.6 MG/1
2 TABLET ORAL
Qty: 0 | Refills: 0 | DISCHARGE
Start: 2020-08-26

## 2020-08-26 RX ORDER — POLYETHYLENE GLYCOL 3350 17 G/17G
17 POWDER, FOR SOLUTION ORAL
Qty: 0 | Refills: 0 | DISCHARGE
Start: 2020-08-26

## 2020-08-26 RX ORDER — POTASSIUM CHLORIDE 20 MEQ
40 PACKET (EA) ORAL ONCE
Refills: 0 | Status: COMPLETED | OUTPATIENT
Start: 2020-08-26 | End: 2020-08-26

## 2020-08-26 RX ADMIN — Medication 40 MILLIEQUIVALENT(S): at 08:49

## 2020-08-26 RX ADMIN — Medication 20 MILLIGRAM(S): at 08:59

## 2020-08-26 RX ADMIN — ENOXAPARIN SODIUM 30 MILLIGRAM(S): 100 INJECTION SUBCUTANEOUS at 12:10

## 2020-08-26 RX ADMIN — POLYETHYLENE GLYCOL 3350 17 GRAM(S): 17 POWDER, FOR SOLUTION ORAL at 12:10

## 2020-08-26 NOTE — PROGRESS NOTE ADULT - PROVIDER SPECIALTY LIST ADULT
Hospitalist
Internal Medicine
Orthopedics
Orthopedics
Internal Medicine

## 2020-08-26 NOTE — DISCHARGE NOTE NURSING/CASE MANAGEMENT/SOCIAL WORK - PATIENT PORTAL LINK FT
You can access the FollowMyHealth Patient Portal offered by [facility] by registering at the following website: [email or web address]. By joining [facility]’s FollowMyHealth portal, you will also be able to view your health information using other applications (apps) compatible with our system.

## 2020-08-26 NOTE — DISCHARGE NOTE NURSING/CASE MANAGEMENT/SOCIAL WORK - NSDCFUADDAPPT_GEN_ALL_CORE_FT
Please schedule a follow up appointment for your cervical canal stenosis with Dr. Roel Brannon in 6 months for an MRI.    Please make an appointment for follow up with [facility] at [phone] within 1-2 weeks of discharge from the hospital.

## 2020-08-26 NOTE — PROGRESS NOTE ADULT - SUBJECTIVE AND OBJECTIVE BOX
Patient was seen and examined by me at bedside. I agree with resident's note, subjective, objective physical exam, assessment and plan with following modifications/additions.    VS-afeb, HD stable, minimal 02 need still in 90's on RA, Aox3 lungs faint crackles at bases, CV RRR, abd soft, nt ext wwp no sig le edema. Labs/imaging reviewed- WBC down, creat improved    A/P: 65F who reports no significant PMHx brought in found down admitted for sepsis 2/2 LLL pneumonia with likely ATN c/b mild volume overload as well as rhabdo, all resolving, mild residual fluid overload on lung imaging  -Diurese off with additional 20IV lasix today, likely dry now  -Completed CAP therapy for 5 days with sig improvement WBC, fever, clinical symptoms, no further rx needed  -Mild transaminitis may be fatty liver disease, neg RUQ US  -TOV after olmos dc yest passed, without olmos now  -plan for MICHELE later today    Shahbaz Haile MD [phone]

## 2020-08-26 NOTE — PROGRESS NOTE ADULT - REASON FOR ADMISSION
AMS, cough

## 2020-08-28 DIAGNOSIS — J96.01 ACUTE RESPIRATORY FAILURE WITH HYPOXIA: ICD-10-CM

## 2020-08-28 DIAGNOSIS — A41.9 SEPSIS, UNSPECIFIED ORGANISM: ICD-10-CM

## 2020-08-28 DIAGNOSIS — D64.9 ANEMIA, UNSPECIFIED: ICD-10-CM

## 2020-08-28 DIAGNOSIS — M51.36 OTHER INTERVERTEBRAL DISC DEGENERATION, LUMBAR REGION: ICD-10-CM

## 2020-08-28 DIAGNOSIS — N17.0 ACUTE KIDNEY FAILURE WITH TUBULAR NECROSIS: ICD-10-CM

## 2020-08-28 DIAGNOSIS — M62.82 RHABDOMYOLYSIS: ICD-10-CM

## 2020-08-28 DIAGNOSIS — R79.89 OTHER SPECIFIED ABNORMAL FINDINGS OF BLOOD CHEMISTRY: ICD-10-CM

## 2020-08-28 DIAGNOSIS — G93.41 METABOLIC ENCEPHALOPATHY: ICD-10-CM

## 2020-08-28 DIAGNOSIS — J18.9 PNEUMONIA, UNSPECIFIED ORGANISM: ICD-10-CM

## 2020-08-28 DIAGNOSIS — R73.9 HYPERGLYCEMIA, UNSPECIFIED: ICD-10-CM

## 2020-08-28 DIAGNOSIS — E87.6 HYPOKALEMIA: ICD-10-CM

## 2020-08-28 DIAGNOSIS — R74.0 NONSPECIFIC ELEVATION OF LEVELS OF TRANSAMINASE AND LACTIC ACID DEHYDROGENASE [LDH]: ICD-10-CM

## 2020-08-28 DIAGNOSIS — E86.0 DEHYDRATION: ICD-10-CM

## 2020-09-08 ENCOUNTER — APPOINTMENT (OUTPATIENT)
Dept: INTERNAL MEDICINE | Facility: CLINIC | Age: 65
End: 2020-09-08

## 2020-10-20 ENCOUNTER — APPOINTMENT (OUTPATIENT)
Dept: INTERNAL MEDICINE | Facility: CLINIC | Age: 65
End: 2020-10-20

## 2020-10-20 ENCOUNTER — APPOINTMENT (OUTPATIENT)
Dept: INTERNAL MEDICINE | Facility: CLINIC | Age: 65
End: 2020-10-20
Payer: MEDICARE

## 2020-10-20 DIAGNOSIS — R74.8 ABNORMAL LEVELS OF OTHER SERUM ENZYMES: ICD-10-CM

## 2020-10-20 PROCEDURE — 99443: CPT | Mod: GC

## 2020-10-23 PROBLEM — R74.8 ELEVATED LIVER ENZYMES: Status: ACTIVE | Noted: 2020-10-20

## 2020-10-23 NOTE — END OF VISIT
[FreeTextEntry3] : pt notes forgetfullness and discussed as also body feels like still recovering likely brain function too.\par however, if not resolved at follow up due a mini-mental assessment to evaluate and determine any other needed steps

## 2020-10-23 NOTE — HISTORY OF PRESENT ILLNESS
[FreeTextEntry2] : an established pt at this office reached out to this provider for xxxx. No recent visit within the last 7 days. A visit is not scheduled within the next 7 days at this time. \par \par Discussed with patient: You have chosen to receive care through the use of tele-media. Tele-media enables health care providers at different locations to provide safe, effective, and convenient care through the use of technology. Please note this is a billable encounter. As with any health care service, there are risks associated with the use of tele-media, including equipment failure, poor image and/or resolution, and  issues. You understand that I cannot physically examine you and that you may need to come to the clinic to complete the assessment. Patient agreed verbally to understanding the risks and benefits of tele-media as explained. All questions regarding tele-media encounters were answered. \par \par \par Total time spent with patient on the phone is 30min\par \par \par \par \par \par 65F who reports no significant PMHx, BIBEMS after being found down at home for , complaining of confusion, cough and back pain, admitted for sepsis 2/2 pneumonia s/p fluids in ED with possible fluid overload c/b acute respiratory \par hypoxemia requiring oxygen supplementation and rhabdomyolysis. Patient was discharged to rehab and discharged from rehab in late September. Patient reports that she is working with PT with marked improvement in her SOB although still present. Patient report that O2 sats are greater than 93%. Patient denies fevers, abdominal pain, yellowing of eyes or skin.

## 2020-10-23 NOTE — PLAN
[FreeTextEntry1] : #AHRF 2/2 aspiration pna - resolved s/p ceftriaxone and azithromycin. Patient reports some residual sob although much improved. Patient with pulse ox and reports oxygen saturation greater than 93%. \par - monitor at next clinic visit\par \par #Transaminitis - elevated LFTs during admission. RUQ US with hepatomegaly etiology thought to be 2/2 AREVALO. Patient denies abdominal pain, N/V. Denies yellow eyes or skin.\par - GI referral\par \par #Cervical spine stenosis - asymptomatic \par - f/u with Dr. Brannon in 2/2021 for repeat cervical MRI\par \par #HCM\par - RTC in 6 weeks to discuss HCM as patient w/o primary f/u in some time

## 2020-12-10 ENCOUNTER — APPOINTMENT (OUTPATIENT)
Dept: INTERNAL MEDICINE | Facility: CLINIC | Age: 65
End: 2020-12-10
Payer: MEDICARE

## 2020-12-10 VITALS
HEART RATE: 82 BPM | RESPIRATION RATE: 15 BRPM | OXYGEN SATURATION: 97 % | BODY MASS INDEX: 31.39 KG/M2 | TEMPERATURE: 96.8 F | DIASTOLIC BLOOD PRESSURE: 69 MMHG | WEIGHT: 200 LBS | HEIGHT: 67 IN | SYSTOLIC BLOOD PRESSURE: 106 MMHG

## 2020-12-10 DIAGNOSIS — Z23 ENCOUNTER FOR IMMUNIZATION: ICD-10-CM

## 2020-12-10 DIAGNOSIS — Z13.1 ENCOUNTER FOR SCREENING FOR DIABETES MELLITUS: ICD-10-CM

## 2020-12-10 DIAGNOSIS — E55.9 VITAMIN D DEFICIENCY, UNSPECIFIED: ICD-10-CM

## 2020-12-10 PROCEDURE — 36415 COLL VENOUS BLD VENIPUNCTURE: CPT

## 2020-12-10 PROCEDURE — 90732 PPSV23 VACC 2 YRS+ SUBQ/IM: CPT

## 2020-12-10 PROCEDURE — 99072 ADDL SUPL MATRL&STAF TM PHE: CPT

## 2020-12-10 PROCEDURE — 99214 OFFICE O/P EST MOD 30 MIN: CPT | Mod: GC,25

## 2020-12-10 PROCEDURE — G0009: CPT

## 2020-12-15 LAB
25(OH)D3 SERPL-MCNC: 23.7 NG/ML
ALBUMIN SERPL ELPH-MCNC: 4.3 G/DL
ALP BLD-CCNC: 51 U/L
ALT SERPL-CCNC: 18 U/L
ANION GAP SERPL CALC-SCNC: 11 MMOL/L
AST SERPL-CCNC: 20 U/L
BASOPHILS # BLD AUTO: 0.05 K/UL
BASOPHILS NFR BLD AUTO: 0.8 %
BILIRUB SERPL-MCNC: 0.3 MG/DL
BUN SERPL-MCNC: 17 MG/DL
CALCIUM SERPL-MCNC: 9.9 MG/DL
CHLORIDE SERPL-SCNC: 103 MMOL/L
CHOLEST SERPL-MCNC: 278 MG/DL
CO2 SERPL-SCNC: 27 MMOL/L
CREAT SERPL-MCNC: 0.92 MG/DL
EOSINOPHIL # BLD AUTO: 0.07 K/UL
EOSINOPHIL NFR BLD AUTO: 1.1 %
ESTIMATED AVERAGE GLUCOSE: 117 MG/DL
GLUCOSE SERPL-MCNC: 95 MG/DL
HBA1C MFR BLD HPLC: 5.7 %
HCT VFR BLD CALC: 45.3 %
HDLC SERPL-MCNC: 51 MG/DL
HGB BLD-MCNC: 13.2 G/DL
IMM GRANULOCYTES NFR BLD AUTO: 0.6 %
LDLC SERPL CALC-MCNC: 191 MG/DL
LYMPHOCYTES # BLD AUTO: 1.41 K/UL
LYMPHOCYTES NFR BLD AUTO: 22.1 %
MAN DIFF?: NORMAL
MCHC RBC-ENTMCNC: 29.1 GM/DL
MCHC RBC-ENTMCNC: 29.1 PG
MCV RBC AUTO: 99.8 FL
MONOCYTES # BLD AUTO: 0.5 K/UL
MONOCYTES NFR BLD AUTO: 7.8 %
NEUTROPHILS # BLD AUTO: 4.3 K/UL
NEUTROPHILS NFR BLD AUTO: 67.6 %
NONHDLC SERPL-MCNC: 228 MG/DL
PLATELET # BLD AUTO: 402 K/UL
POTASSIUM SERPL-SCNC: 4.7 MMOL/L
PROT SERPL-MCNC: 7.5 G/DL
RBC # BLD: 4.54 M/UL
RBC # FLD: 13.2 %
SODIUM SERPL-SCNC: 141 MMOL/L
TRIGL SERPL-MCNC: 183 MG/DL
TSH SERPL-ACNC: 2.29 UIU/ML
WBC # FLD AUTO: 6.37 K/UL

## 2021-01-08 ENCOUNTER — APPOINTMENT (OUTPATIENT)
Dept: GASTROENTEROLOGY | Facility: CLINIC | Age: 66
End: 2021-01-08

## 2021-02-02 ENCOUNTER — APPOINTMENT (OUTPATIENT)
Dept: INTERNAL MEDICINE | Facility: CLINIC | Age: 66
End: 2021-02-02

## 2021-02-11 ENCOUNTER — APPOINTMENT (OUTPATIENT)
Dept: INTERNAL MEDICINE | Facility: CLINIC | Age: 66
End: 2021-02-11
Payer: MEDICARE

## 2021-02-11 VITALS
HEIGHT: 67 IN | RESPIRATION RATE: 14 BRPM | BODY MASS INDEX: 32.02 KG/M2 | WEIGHT: 204 LBS | TEMPERATURE: 97.4 F | HEART RATE: 101 BPM | DIASTOLIC BLOOD PRESSURE: 73 MMHG | OXYGEN SATURATION: 98 % | SYSTOLIC BLOOD PRESSURE: 127 MMHG

## 2021-02-11 DIAGNOSIS — Z12.39 ENCOUNTER FOR OTHER SCREENING FOR MALIGNANT NEOPLASM OF BREAST: ICD-10-CM

## 2021-02-11 DIAGNOSIS — Z13.820 ENCOUNTER FOR SCREENING FOR OSTEOPOROSIS: ICD-10-CM

## 2021-02-11 DIAGNOSIS — M48.02 SPINAL STENOSIS, CERVICAL REGION: ICD-10-CM

## 2021-02-11 PROCEDURE — 99214 OFFICE O/P EST MOD 30 MIN: CPT | Mod: GC

## 2021-02-11 RX ORDER — MULTIVIT-MIN/FOLIC/VIT K/LYCOP 400-300MCG
10 MCG TABLET ORAL DAILY
Qty: 90 | Refills: 0 | Status: ACTIVE | COMMUNITY
Start: 2021-02-11 | End: 1900-01-01

## 2021-05-10 ENCOUNTER — OUTPATIENT (OUTPATIENT)
Dept: OUTPATIENT SERVICES | Facility: HOSPITAL | Age: 66
LOS: 1 days | End: 2021-05-10
Payer: MEDICARE

## 2021-05-10 ENCOUNTER — APPOINTMENT (OUTPATIENT)
Dept: RADIOLOGY | Facility: HOSPITAL | Age: 66
End: 2021-05-10
Payer: MEDICARE

## 2021-05-10 ENCOUNTER — RESULT REVIEW (OUTPATIENT)
Age: 66
End: 2021-05-10

## 2021-05-10 DIAGNOSIS — Z78.9 OTHER SPECIFIED HEALTH STATUS: Chronic | ICD-10-CM

## 2021-05-10 PROCEDURE — 77085 DXA BONE DENSITY AXL VRT FX: CPT

## 2021-05-10 PROCEDURE — 77085 DXA BONE DENSITY AXL VRT FX: CPT | Mod: 26

## 2021-05-13 ENCOUNTER — NON-APPOINTMENT (OUTPATIENT)
Age: 66
End: 2021-05-13

## 2021-05-20 ENCOUNTER — APPOINTMENT (OUTPATIENT)
Dept: MAMMOGRAPHY | Facility: HOSPITAL | Age: 66
End: 2021-05-20

## 2021-05-27 ENCOUNTER — NON-APPOINTMENT (OUTPATIENT)
Age: 66
End: 2021-05-27

## 2021-05-27 ENCOUNTER — APPOINTMENT (OUTPATIENT)
Dept: INTERNAL MEDICINE | Facility: CLINIC | Age: 66
End: 2021-05-27
Payer: MEDICARE

## 2021-05-27 VITALS
RESPIRATION RATE: 15 BRPM | DIASTOLIC BLOOD PRESSURE: 80 MMHG | TEMPERATURE: 98.2 F | SYSTOLIC BLOOD PRESSURE: 118 MMHG | HEIGHT: 67 IN | WEIGHT: 210 LBS | BODY MASS INDEX: 32.96 KG/M2 | HEART RATE: 89 BPM | OXYGEN SATURATION: 95 %

## 2021-05-27 PROCEDURE — 99214 OFFICE O/P EST MOD 30 MIN: CPT | Mod: GC

## 2021-06-09 ENCOUNTER — OUTPATIENT (OUTPATIENT)
Dept: OUTPATIENT SERVICES | Facility: HOSPITAL | Age: 66
LOS: 1 days | End: 2021-06-09
Payer: MEDICARE

## 2021-06-09 DIAGNOSIS — Z78.9 OTHER SPECIFIED HEALTH STATUS: Chronic | ICD-10-CM

## 2021-06-09 PROCEDURE — 73502 X-RAY EXAM HIP UNI 2-3 VIEWS: CPT

## 2021-06-09 PROCEDURE — 73502 X-RAY EXAM HIP UNI 2-3 VIEWS: CPT | Mod: 26,LT

## 2021-07-26 DIAGNOSIS — Z87.01 PERSONAL HISTORY OF PNEUMONIA (RECURRENT): ICD-10-CM

## 2021-08-13 ENCOUNTER — TRANSCRIPTION ENCOUNTER (OUTPATIENT)
Age: 66
End: 2021-08-13

## 2021-08-13 ENCOUNTER — NON-APPOINTMENT (OUTPATIENT)
Age: 66
End: 2021-08-13

## 2021-08-13 ENCOUNTER — APPOINTMENT (OUTPATIENT)
Dept: PULMONOLOGY | Facility: CLINIC | Age: 66
End: 2021-08-13
Payer: MEDICARE

## 2021-08-13 VITALS
BODY MASS INDEX: 32.96 KG/M2 | HEART RATE: 80 BPM | OXYGEN SATURATION: 95 % | HEIGHT: 67 IN | WEIGHT: 210 LBS | SYSTOLIC BLOOD PRESSURE: 114 MMHG | DIASTOLIC BLOOD PRESSURE: 73 MMHG | TEMPERATURE: 98.1 F

## 2021-08-13 PROCEDURE — 71046 X-RAY EXAM CHEST 2 VIEWS: CPT

## 2021-08-13 PROCEDURE — 99203 OFFICE O/P NEW LOW 30 MIN: CPT

## 2021-08-16 ENCOUNTER — LABORATORY RESULT (OUTPATIENT)
Age: 66
End: 2021-08-16

## 2021-08-26 ENCOUNTER — APPOINTMENT (OUTPATIENT)
Dept: PULMONOLOGY | Facility: CLINIC | Age: 66
End: 2021-08-26

## 2021-09-14 ENCOUNTER — NON-APPOINTMENT (OUTPATIENT)
Age: 66
End: 2021-09-14

## 2021-09-17 ENCOUNTER — LABORATORY RESULT (OUTPATIENT)
Age: 66
End: 2021-09-17

## 2021-09-20 ENCOUNTER — APPOINTMENT (OUTPATIENT)
Dept: PULMONOLOGY | Facility: CLINIC | Age: 66
End: 2021-09-20
Payer: MEDICARE

## 2021-09-20 PROCEDURE — 94727 GAS DIL/WSHOT DETER LNG VOL: CPT

## 2021-09-20 PROCEDURE — 94060 EVALUATION OF WHEEZING: CPT

## 2021-09-20 PROCEDURE — 94729 DIFFUSING CAPACITY: CPT

## 2021-10-05 ENCOUNTER — RESULT REVIEW (OUTPATIENT)
Age: 66
End: 2021-10-05

## 2021-10-05 ENCOUNTER — APPOINTMENT (OUTPATIENT)
Dept: CT IMAGING | Facility: HOSPITAL | Age: 66
End: 2021-10-05
Payer: MEDICARE

## 2021-10-05 ENCOUNTER — OUTPATIENT (OUTPATIENT)
Dept: OUTPATIENT SERVICES | Facility: HOSPITAL | Age: 66
LOS: 1 days | End: 2021-10-05
Payer: MEDICARE

## 2021-10-05 DIAGNOSIS — Z78.9 OTHER SPECIFIED HEALTH STATUS: Chronic | ICD-10-CM

## 2021-10-05 PROCEDURE — 71250 CT THORAX DX C-: CPT

## 2021-10-05 PROCEDURE — 71250 CT THORAX DX C-: CPT | Mod: 26,MH

## 2021-10-06 ENCOUNTER — EMERGENCY (EMERGENCY)
Facility: HOSPITAL | Age: 66
LOS: 1 days | Discharge: HOME | End: 2021-10-06
Attending: EMERGENCY MEDICINE | Admitting: EMERGENCY MEDICINE
Payer: MEDICARE

## 2021-10-06 VITALS
SYSTOLIC BLOOD PRESSURE: 151 MMHG | DIASTOLIC BLOOD PRESSURE: 88 MMHG | RESPIRATION RATE: 18 BRPM | HEART RATE: 65 BPM | TEMPERATURE: 98 F | OXYGEN SATURATION: 98 %

## 2021-10-06 VITALS
WEIGHT: 210.1 LBS | HEART RATE: 94 BPM | SYSTOLIC BLOOD PRESSURE: 174 MMHG | DIASTOLIC BLOOD PRESSURE: 102 MMHG | HEIGHT: 67 IN | TEMPERATURE: 98 F | OXYGEN SATURATION: 98 % | RESPIRATION RATE: 16 BRPM

## 2021-10-06 DIAGNOSIS — R00.2 PALPITATIONS: ICD-10-CM

## 2021-10-06 DIAGNOSIS — Z87.01 PERSONAL HISTORY OF PNEUMONIA (RECURRENT): ICD-10-CM

## 2021-10-06 DIAGNOSIS — Z78.9 OTHER SPECIFIED HEALTH STATUS: Chronic | ICD-10-CM

## 2021-10-06 DIAGNOSIS — Z88.5 ALLERGY STATUS TO NARCOTIC AGENT: ICD-10-CM

## 2021-10-06 LAB
ALBUMIN SERPL ELPH-MCNC: 4.3 G/DL — SIGNIFICANT CHANGE UP (ref 3.3–5)
ALP SERPL-CCNC: 62 U/L — SIGNIFICANT CHANGE UP (ref 40–120)
ALT FLD-CCNC: 28 U/L — SIGNIFICANT CHANGE UP (ref 10–45)
ANION GAP SERPL CALC-SCNC: 10 MMOL/L — SIGNIFICANT CHANGE UP (ref 5–17)
APTT BLD: 27.9 SEC — SIGNIFICANT CHANGE UP (ref 27.5–35.5)
AST SERPL-CCNC: 23 U/L — SIGNIFICANT CHANGE UP (ref 10–40)
BASOPHILS # BLD AUTO: 0.03 K/UL — SIGNIFICANT CHANGE UP (ref 0–0.2)
BASOPHILS NFR BLD AUTO: 0.4 % — SIGNIFICANT CHANGE UP (ref 0–2)
BILIRUB SERPL-MCNC: 0.4 MG/DL — SIGNIFICANT CHANGE UP (ref 0.2–1.2)
BUN SERPL-MCNC: 12 MG/DL — SIGNIFICANT CHANGE UP (ref 7–23)
CALCIUM SERPL-MCNC: 9.7 MG/DL — SIGNIFICANT CHANGE UP (ref 8.4–10.5)
CHLORIDE SERPL-SCNC: 102 MMOL/L — SIGNIFICANT CHANGE UP (ref 96–108)
CO2 SERPL-SCNC: 27 MMOL/L — SIGNIFICANT CHANGE UP (ref 22–31)
CREAT SERPL-MCNC: 0.86 MG/DL — SIGNIFICANT CHANGE UP (ref 0.5–1.3)
EOSINOPHIL # BLD AUTO: 0.06 K/UL — SIGNIFICANT CHANGE UP (ref 0–0.5)
EOSINOPHIL NFR BLD AUTO: 0.9 % — SIGNIFICANT CHANGE UP (ref 0–6)
GLUCOSE SERPL-MCNC: 105 MG/DL — HIGH (ref 70–99)
HCT VFR BLD CALC: 39.9 % — SIGNIFICANT CHANGE UP (ref 34.5–45)
HGB BLD-MCNC: 13.7 G/DL — SIGNIFICANT CHANGE UP (ref 11.5–15.5)
IMM GRANULOCYTES NFR BLD AUTO: 0.3 % — SIGNIFICANT CHANGE UP (ref 0–1.5)
INR BLD: 0.97 — SIGNIFICANT CHANGE UP (ref 0.88–1.16)
LYMPHOCYTES # BLD AUTO: 1.54 K/UL — SIGNIFICANT CHANGE UP (ref 1–3.3)
LYMPHOCYTES # BLD AUTO: 23 % — SIGNIFICANT CHANGE UP (ref 13–44)
MCHC RBC-ENTMCNC: 32.2 PG — SIGNIFICANT CHANGE UP (ref 27–34)
MCHC RBC-ENTMCNC: 34.3 GM/DL — SIGNIFICANT CHANGE UP (ref 32–36)
MCV RBC AUTO: 93.7 FL — SIGNIFICANT CHANGE UP (ref 80–100)
MONOCYTES # BLD AUTO: 0.6 K/UL — SIGNIFICANT CHANGE UP (ref 0–0.9)
MONOCYTES NFR BLD AUTO: 9 % — SIGNIFICANT CHANGE UP (ref 2–14)
NEUTROPHILS # BLD AUTO: 4.45 K/UL — SIGNIFICANT CHANGE UP (ref 1.8–7.4)
NEUTROPHILS NFR BLD AUTO: 66.4 % — SIGNIFICANT CHANGE UP (ref 43–77)
NRBC # BLD: 0 /100 WBCS — SIGNIFICANT CHANGE UP (ref 0–0)
PLATELET # BLD AUTO: 439 K/UL — HIGH (ref 150–400)
POTASSIUM SERPL-MCNC: 4.5 MMOL/L — SIGNIFICANT CHANGE UP (ref 3.5–5.3)
POTASSIUM SERPL-SCNC: 4.5 MMOL/L — SIGNIFICANT CHANGE UP (ref 3.5–5.3)
PROT SERPL-MCNC: 8.3 G/DL — SIGNIFICANT CHANGE UP (ref 6–8.3)
PROTHROM AB SERPL-ACNC: 11.7 SEC — SIGNIFICANT CHANGE UP (ref 10.6–13.6)
RBC # BLD: 4.26 M/UL — SIGNIFICANT CHANGE UP (ref 3.8–5.2)
RBC # FLD: 13.5 % — SIGNIFICANT CHANGE UP (ref 10.3–14.5)
SODIUM SERPL-SCNC: 139 MMOL/L — SIGNIFICANT CHANGE UP (ref 135–145)
WBC # BLD: 6.7 K/UL — SIGNIFICANT CHANGE UP (ref 3.8–10.5)
WBC # FLD AUTO: 6.7 K/UL — SIGNIFICANT CHANGE UP (ref 3.8–10.5)

## 2021-10-06 PROCEDURE — 36415 COLL VENOUS BLD VENIPUNCTURE: CPT

## 2021-10-06 PROCEDURE — 85730 THROMBOPLASTIN TIME PARTIAL: CPT

## 2021-10-06 PROCEDURE — 99284 EMERGENCY DEPT VISIT MOD MDM: CPT

## 2021-10-06 PROCEDURE — 93005 ELECTROCARDIOGRAM TRACING: CPT

## 2021-10-06 PROCEDURE — 85025 COMPLETE CBC W/AUTO DIFF WBC: CPT

## 2021-10-06 PROCEDURE — 99283 EMERGENCY DEPT VISIT LOW MDM: CPT | Mod: 25

## 2021-10-06 PROCEDURE — 85610 PROTHROMBIN TIME: CPT

## 2021-10-06 PROCEDURE — 80053 COMPREHEN METABOLIC PANEL: CPT

## 2021-10-06 PROCEDURE — 93010 ELECTROCARDIOGRAM REPORT: CPT

## 2021-10-06 PROCEDURE — 96361 HYDRATE IV INFUSION ADD-ON: CPT

## 2021-10-06 PROCEDURE — 96360 HYDRATION IV INFUSION INIT: CPT

## 2021-10-06 RX ORDER — SODIUM CHLORIDE 9 MG/ML
1000 INJECTION INTRAMUSCULAR; INTRAVENOUS; SUBCUTANEOUS ONCE
Refills: 0 | Status: COMPLETED | OUTPATIENT
Start: 2021-10-06 | End: 2021-10-06

## 2021-10-06 RX ADMIN — SODIUM CHLORIDE 1000 MILLILITER(S): 9 INJECTION INTRAMUSCULAR; INTRAVENOUS; SUBCUTANEOUS at 17:24

## 2021-10-06 RX ADMIN — SODIUM CHLORIDE 1000 MILLILITER(S): 9 INJECTION INTRAMUSCULAR; INTRAVENOUS; SUBCUTANEOUS at 15:08

## 2021-10-06 NOTE — ED ADULT NURSE REASSESSMENT NOTE - NS ED NURSE REASSESS COMMENT FT1
All symptoms resolved, no allergic rxn. , all labs resulted.  Vital signs stable. Dischrged to home in stable conditoin.

## 2021-10-06 NOTE — ED PROVIDER NOTE - OBJECTIVE STATEMENT
65yo female with pmhx of pneumonia c/b sepsis presents with palpitations after receiving the 2nd COVID vaccine today. She states that immediately after receiving the vaccine, she felt lightheaded and as if her heart were racing. She reports feeling "an adrenaline rush." She denies hives/itching, oral swelling, difficulty swallowing, difficulty breathing, wheezing, nausea/vomiting, headache, numbness, weakness. She denies reaction to first vaccine.

## 2021-10-06 NOTE — HISTORY OF PRESENT ILLNESS
[TextBox_4] : 2021: Asked to evaluate patient by Dr Agrawal for dyspnea. Became ill Aug last year, dyspnea, fatigue, lost consciousness at home, found by doormen, brought to [facility] and hospitalized in ICU and diagnosed w pneumonia and sepsis, tested negative for Covid. Required 5 weeks in nursing home. Breathing is better, but never recovered to normal after this illness. Is still somewhat dyspneic. Wonders if it is safe for her to get Covid vaccine. Remote minimal smoker with no pre-existing lung disease. 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 Had childhood vaccines, pneumonia and flu shots without reaction. Tearful - during her illness last year her dog was put in a shelter and  there.\par

## 2021-10-06 NOTE — ED PROVIDER NOTE - ATTENDING CONTRIBUTION TO CARE
Pt is afebrile and hemodynamically stable. She has no evidence of anaphylaxis. No arrythmia on ECG. Labs unremarkable. Pt given 1L NS bolus while in ED and observed. Will dc home with PMD follow up. Return precautions given.    Agree with above note as documented by PA.  I was available as the supervising attending during patient's ER evaluation.

## 2021-10-06 NOTE — ED PROVIDER NOTE - PHYSICAL EXAMINATION
VITAL SIGNS: I have reviewed nursing notes and confirm.  CONSTITUTIONAL: Well-developed;  in no acute distress.   SKIN:  warm and dry, no acute rash.   HEAD:  normocephalic, atraumatic.  EYES: PERRL, EOM intact; conjunctiva and sclera clear.  ENT: No nasal discharge; airway clear. No oral swelling. Managing secretions appropriately.   NECK: Supple; non tender.  CARD: S1, S2 normal; no murmurs, gallops, or rubs. Regular rate and rhythm.   RESP:  Clear to auscultation b/l, no wheezes, rales or rhonchi.  ABD: Normal bowel sounds; soft; non-distended; non-tender; no guarding/ rebound.  EXT: Normal ROM. No clubbing, cyanosis or edema. 2+ pulses to b/l ue/le.  NEURO: Alert, oriented, grossly unremarkable  PSYCH: Cooperative, mood and affect appropriate.

## 2021-10-06 NOTE — ED PROVIDER NOTE - CLINICAL SUMMARY MEDICAL DECISION MAKING FREE TEXT BOX
Pt is afebrile and hemodynamically stable. She has no evidence of anaphylaxis. No arrythmia on ECG. Labs unremarkable. Pt given 1L NS bolus while in ED and observed. Will dc home with PMD follow up. Return precautions given.

## 2021-10-06 NOTE — ED PROVIDER NOTE - PATIENT PORTAL LINK FT
You can access the FollowMyHealth Patient Portal offered by [facility] by registering at the following website: [email or web address]. By joining [facility]’s FollowMyHealth portal, you will also be able to view your health information using other applications (apps) compatible with our system.

## 2021-10-06 NOTE — ASSESSMENT
[FreeTextEntry1] : Data reviewed:\par \par PA/lat CXR in office 08/13/2021 : clear lungs / this was compared to the hospital images showing densely consolidated sarah pneumonia w sarah effusions\par \par Impression:\par Dyspnea following bilateral pneumonia and sepsis 8/2020\par \par Plan:\par The CXR shows radiographic resolution of her illness last year.\par Will obtain PFT to evaluate her dyspnea.\par Discussed that there is no finding from this lung evaluation that would post a contraindication to vaccination or change the strong recommendation to be vaccinated asap. Having lung disease, if she does, makes it more important to be vaccinated, not less. Answered her questions about the vaccine and about Covid to the best of my ability. I made her an appointment to be vaccinated at [facility] on 8/18 at her eventual request.\par --\par PFT 9/20/21: no obstruction, restricted w FVC 67%, TLC 57%, DLCO 51%\par Still dyspneic, fatigued, depressed. I don't know if she has any lung disease but she certainly had severe pneumonia last year and has an abnormal PFT. Will get a CT chest.\par --\par CT chest [facility] 10/2021 personally reviewed : 1. Since 8/21/2020, there is no evidence of fibrosis. 2. Large areas of consolidation bilaterally have resolved. 3. Mild cylindrical bronchiectasis bilaterally. 4. There are several small calcified and noncalcified solid lung nodules bilaterally. 5. Small hiatal hernia. / LM on VM

## 2021-10-06 NOTE — ED ADULT NURSE NOTE - OBJECTIVE STATEMENT
pt received into spot 20 A&Ox3 ambulatory appears comfortable arrives from vaccine administration center for eval of 2nd dose of pfzizer vax today states afterward she feels "too stimulated" flushed cheeks look slightly red states feels like she drank too much coffee. denies cp admits to palpitations no sob cough runny nose sore throat fever chills no head ache blurry vision neuro intact resp even and unlabored speaks clear full sentences tolerates secretions no rash no hives or itching noted 12 lead ekg done nsr noted 20G placed to RAc labs drawn and sent pt in nad

## 2021-10-06 NOTE — ED ADULT TRIAGE NOTE - CHIEF COMPLAINT QUOTE
Pt escorted to ED by MD BATES COVID vaccine reaction.  Pt states "I got my 2nd dose of Pfizer today and now I feel very stimulated like I had too much coffee."

## 2021-10-06 NOTE — ED PROVIDER NOTE - NS ED ROS FT
Constitutional: No fever. No chills.  Eyes: No redness. No discharge. No vision change.   ENT: No sore throat. No ear pain.  Cardiovascular: No chest pain. No leg swelling. +palpitations.  Respiratory: No cough. No shortness of breath.  GI: No abdominal pain. No vomiting. No diarrhea.   MSK: No joint pain. No back pain.   Skin: No rash. No abrasions.   Neuro: No numbness. No weakness.   Psych: No known mental health issues.

## 2021-10-06 NOTE — PHYSICAL EXAM
[No Acute Distress] : no acute distress [Normal Rate/Rhythm] : normal rate/rhythm [Normal S1, S2] : normal s1, s2 [No Murmurs] : no murmurs [No Resp Distress] : no resp distress [Clear to Auscultation Bilaterally] : clear to auscultation bilaterally [No Edema] : no edema [Normal Affect] : normal affect [TextBox_140] : tearful re dog

## 2021-10-06 NOTE — ED PROVIDER NOTE - NSFOLLOWUPINSTRUCTIONS_ED_ALL_ED_FT
Your labs were reassuring today.    You do not have evidence of an allergic reaction. Keep a close eye on your symptoms and return if you develop lip or tongue swelling, difficulty swallowing, difficulty breathing, wheezing, hives.    Follow up with your primary care doctor.    Return to the Emergency Department if you develop chest pain, shortness of breath, abdominal pain, vomiting, numbness, weakness or any other concerns.

## 2021-10-08 ENCOUNTER — NON-APPOINTMENT (OUTPATIENT)
Age: 66
End: 2021-10-08

## 2021-10-17 NOTE — ED PROVIDER NOTE - NSICDXFAMILYHX_GEN_ALL_CORE_FT
FAMILY HISTORY:  Family history of diabetes mellitus (DM), father  FH: HTN (hypertension), mother     (4) no limitation

## 2021-12-01 NOTE — ED PROVIDER NOTE - CLINICAL SUMMARY MEDICAL DECISION MAKING FREE TEXT BOX
No 65F who denies PMHX, lives alone, who was BIBEMS for AMS. Per EMS, someone in her building noticed she had a seamless delivery outside her door from Sunday (3 days ago) so they did a well check and found her down on the ground. Pt thinks she might have passed out and is c/o LBP and feeling dehydrated. Pt febrile and tachycardic on arrival, worked up per sepsis protocol and wt based fluids and IV abx given, EK sinus tach, no stemi, CXR shows LLL pna, lactate wnl. Still pending labs, CT head and c/l-spine order given fall/syncope and ams with LBP. pt will require admission 65F who denies PMHX, lives alone, who was BIBEMS for AMS. Per EMS, someone in her building noticed she had a seamless delivery outside her door from Sunday (3 days ago) so they did a well check and found her down on the ground. Pt thinks she might have passed out and is c/o LBP and feeling dehydrated. Pt febrile and tachycardic on arrival, worked up per sepsis protocol and wt based fluids and IV abx given, EK sinus tach, no stemi, CXR shows LLL pna, lactate wnl. Still pending labs, CT head and c/l-spine order given fall/syncope and ams with LBP. pt will require admission.  ICu was consulted. initial labs appear to be erroneous, repeat labs show normal K, HERI, normal lactate. CT with no emergent findings, chronic DJD, pt VS and mental status improved with IVFs, pt see by ICU and they deemed patient stable for the floor. D/w Hospitalist Dr. Ramon for admission.

## 2022-01-03 ENCOUNTER — NON-APPOINTMENT (OUTPATIENT)
Age: 67
End: 2022-01-03

## 2022-01-03 ENCOUNTER — APPOINTMENT (OUTPATIENT)
Dept: INTERNAL MEDICINE | Facility: CLINIC | Age: 67
End: 2022-01-03
Payer: COMMERCIAL

## 2022-01-03 VITALS
HEIGHT: 67 IN | WEIGHT: 210 LBS | BODY MASS INDEX: 32.96 KG/M2 | TEMPERATURE: 97.6 F | HEART RATE: 73 BPM | OXYGEN SATURATION: 96 % | SYSTOLIC BLOOD PRESSURE: 132 MMHG | DIASTOLIC BLOOD PRESSURE: 80 MMHG

## 2022-01-03 DIAGNOSIS — M25.562 PAIN IN LEFT KNEE: ICD-10-CM

## 2022-01-03 DIAGNOSIS — Z78.9 OTHER SPECIFIED HEALTH STATUS: ICD-10-CM

## 2022-01-03 DIAGNOSIS — Z83.3 FAMILY HISTORY OF DIABETES MELLITUS: ICD-10-CM

## 2022-01-03 DIAGNOSIS — N64.9 DISORDER OF BREAST, UNSPECIFIED: ICD-10-CM

## 2022-01-03 DIAGNOSIS — R39.11 HESITANCY OF MICTURITION: ICD-10-CM

## 2022-01-03 DIAGNOSIS — F43.21 ADJUSTMENT DISORDER WITH DEPRESSED MOOD: ICD-10-CM

## 2022-01-03 PROCEDURE — 99213 OFFICE O/P EST LOW 20 MIN: CPT | Mod: 25

## 2022-01-03 PROCEDURE — 90662 IIV NO PRSV INCREASED AG IM: CPT

## 2022-01-03 PROCEDURE — 99397 PER PM REEVAL EST PAT 65+ YR: CPT | Mod: 25

## 2022-01-03 PROCEDURE — 36415 COLL VENOUS BLD VENIPUNCTURE: CPT

## 2022-01-03 PROCEDURE — G0008: CPT

## 2022-01-04 LAB
25(OH)D3 SERPL-MCNC: 32.5 NG/ML
ALBUMIN SERPL ELPH-MCNC: 4.4 G/DL
ALP BLD-CCNC: 66 U/L
ALT SERPL-CCNC: 22 U/L
ANION GAP SERPL CALC-SCNC: 12 MMOL/L
APPEARANCE: CLEAR
AST SERPL-CCNC: 19 U/L
BASOPHILS # BLD AUTO: 0.05 K/UL
BASOPHILS NFR BLD AUTO: 0.7 %
BILIRUB SERPL-MCNC: 0.2 MG/DL
BILIRUBIN URINE: NEGATIVE
BLOOD URINE: NEGATIVE
BUN SERPL-MCNC: 16 MG/DL
CALCIUM SERPL-MCNC: 9.8 MG/DL
CHLORIDE SERPL-SCNC: 102 MMOL/L
CHOLEST SERPL-MCNC: 283 MG/DL
CO2 SERPL-SCNC: 26 MMOL/L
COLOR: YELLOW
CREAT SERPL-MCNC: 0.99 MG/DL
EOSINOPHIL # BLD AUTO: 0.11 K/UL
EOSINOPHIL NFR BLD AUTO: 1.6 %
ESTIMATED AVERAGE GLUCOSE: 126 MG/DL
GLUCOSE QUALITATIVE U: NEGATIVE
GLUCOSE SERPL-MCNC: 91 MG/DL
HBA1C MFR BLD HPLC: 6 %
HCT VFR BLD CALC: 45.7 %
HDLC SERPL-MCNC: 45 MG/DL
HGB BLD-MCNC: 13.8 G/DL
IMM GRANULOCYTES NFR BLD AUTO: 0.9 %
KETONES URINE: NEGATIVE
LDLC SERPL CALC-MCNC: 185 MG/DL
LEUKOCYTE ESTERASE URINE: NEGATIVE
LYMPHOCYTES # BLD AUTO: 1.85 K/UL
LYMPHOCYTES NFR BLD AUTO: 26.3 %
MAN DIFF?: NORMAL
MCHC RBC-ENTMCNC: 28.4 PG
MCHC RBC-ENTMCNC: 30.2 GM/DL
MCV RBC AUTO: 94 FL
MONOCYTES # BLD AUTO: 0.62 K/UL
MONOCYTES NFR BLD AUTO: 8.8 %
NEUTROPHILS # BLD AUTO: 4.35 K/UL
NEUTROPHILS NFR BLD AUTO: 61.7 %
NITRITE URINE: NEGATIVE
NONHDLC SERPL-MCNC: 238 MG/DL
PH URINE: 6.5
PLATELET # BLD AUTO: 412 K/UL
POTASSIUM SERPL-SCNC: 5.5 MMOL/L
PROT SERPL-MCNC: 7.6 G/DL
PROTEIN URINE: NEGATIVE
RBC # BLD: 4.86 M/UL
RBC # FLD: 13.8 %
SODIUM SERPL-SCNC: 140 MMOL/L
SPECIFIC GRAVITY URINE: 1.02
TRIGL SERPL-MCNC: 265 MG/DL
TSH SERPL-ACNC: 2.88 UIU/ML
UROBILINOGEN URINE: NORMAL
WBC # FLD AUTO: 7.04 K/UL

## 2022-01-04 RX ORDER — ATORVASTATIN CALCIUM 20 MG/1
20 TABLET, FILM COATED ORAL
Qty: 90 | Refills: 1 | Status: ACTIVE | COMMUNITY
Start: 2020-12-15 | End: 1900-01-01

## 2022-01-21 NOTE — PROCEDURE NOTE - NSANATOMICLLOCATION_GEN_A_CORE
[FreeTextEntry1] : abd bloating- stop cabbage, broccoli, cauliflower, cheese, dairy prod for 3 wk. \par DM- decr evening insulin.pt to take an evening snack.  explained how to prevent and tx hypoglycemia\par hypotension- wean off atenolol antecubital fossa/right

## 2022-02-07 ENCOUNTER — APPOINTMENT (OUTPATIENT)
Dept: ORTHOPEDIC SURGERY | Facility: CLINIC | Age: 67
End: 2022-02-07

## 2022-05-20 ENCOUNTER — RESULT REVIEW (OUTPATIENT)
Age: 67
End: 2022-05-20

## 2022-05-20 ENCOUNTER — APPOINTMENT (OUTPATIENT)
Dept: ORTHOPEDIC SURGERY | Facility: CLINIC | Age: 67
End: 2022-05-20
Payer: MEDICARE

## 2022-05-20 ENCOUNTER — OUTPATIENT (OUTPATIENT)
Dept: OUTPATIENT SERVICES | Facility: HOSPITAL | Age: 67
LOS: 1 days | End: 2022-05-20
Payer: MEDICARE

## 2022-05-20 VITALS
WEIGHT: 205 LBS | HEART RATE: 75 BPM | BODY MASS INDEX: 32.18 KG/M2 | HEIGHT: 67 IN | SYSTOLIC BLOOD PRESSURE: 110 MMHG | OXYGEN SATURATION: 94 % | DIASTOLIC BLOOD PRESSURE: 75 MMHG

## 2022-05-20 VITALS — TEMPERATURE: 97.5 F

## 2022-05-20 DIAGNOSIS — Z78.9 OTHER SPECIFIED HEALTH STATUS: Chronic | ICD-10-CM

## 2022-05-20 PROCEDURE — 72170 X-RAY EXAM OF PELVIS: CPT

## 2022-05-20 PROCEDURE — 72170 X-RAY EXAM OF PELVIS: CPT | Mod: 26

## 2022-05-20 PROCEDURE — 73564 X-RAY EXAM KNEE 4 OR MORE: CPT

## 2022-05-20 PROCEDURE — 99215 OFFICE O/P EST HI 40 MIN: CPT

## 2022-05-20 PROCEDURE — 73564 X-RAY EXAM KNEE 4 OR MORE: CPT | Mod: 26,50

## 2022-05-20 RX ORDER — MELOXICAM 7.5 MG/1
7.5 TABLET ORAL DAILY
Qty: 30 | Refills: 2 | Status: ACTIVE | COMMUNITY
Start: 2022-05-20 | End: 1900-01-01

## 2022-05-20 NOTE — REVIEW OF SYSTEMS
[Negative] : Heme/Lymph [Joint Pain] : joint pain [Chills] : chills [Feeling Tired] : fatigue [Nasal Discharge] : nasal discharge

## 2022-05-23 NOTE — PHYSICAL EXAM
[de-identified] : General appearance: well nourished and hydrated, pleasant, alert and oriented x 3, cooperative.  Obese habitus.\par HEENT: normocephalic, EOM intact, wearing mask, external auditory canal clear.  \par Cardiovascular: no lower leg edema, no varicosities, dorsalis pedis pulses palpable and symmetric.  \par Lymphatics: no palpable lymphadenopathy, no lymphedema.  \par Neurologic: sensation is normal, no muscle weakness in upper or lower extremities, patella tendon reflexes present and symmetric.  \par Dermatologic: skin moist, warm, no rash.  \par Spine: cervical spine with normal lordosis and painless range of motion, thoracic spine with normal kyphosis and painless range of motion, lumbosacral spine with normal lordosis and stiff restricted range of motion.  Tenderness to palpation along midline lumbar spine and paraspinal musculature.  Sacroiliac joints tender bilaterally. Negative SLR and crossed SLR tests bilaterally.\par Gait: slow to stand and get started. Left antalgia.\par \par Left knee: all fields negative/normal/unremarkable unless otherwise noted -- \par - Focal soft tissue swelling: \par - Ecchymosis: \par - Erythema: \par - Effusion: \par - Wounds: \par - Alignment: \par - Tenderness: distal medial hamstrings to palpation, peripatellar, medial/ lateral joint line\par - ROM: 0-130\par - Collateral laxity: \par - Cruciate laxity: \par - Popliteal angle (degrees): 45\par - Quad strength: \par \par Right knee: all fields negative/normal/unremarkable unless otherwise noted --\par - Focal soft tissue swelling: \par - Ecchymosis: \par - Erythema: \par - Effusion: \par - Wounds: \par - Alignment: \par - Tenderness: \par - ROM: 0-130\par - Collateral laxity: \par - Cruciate laxity: \par - Meniscal signs: negative Ilan and Paxton.  \par - Popliteal angle (degrees): 45\par - Quad strength: \par \par Left hip: all fields negative/normal/unremarkable unless otherwise noted --\par - Focal soft tissue swelling: \par - Ecchymosis: \par - Erythema: \par - Wounds: \par - Tenderness: mild lateral\par - ROM:\par   - Flexion: 80\par   - Extension: 0\par   - Adduction: 10\par   - Abduction: 10\par   - Internal rotation in 90 degrees of hip flexion: 0\par   - External rotation in 90 degrees of hip flexion: 15\par - JCARLOS: stiff and uncomfortable\par - FADIR: stiff and uncomfortable\par - Fortunato: positive\par - Stinchfield: positive\par - Flexor power: 4+/5\par - Abductor power: 4+/5 [de-identified] : AP pelvis and 4 views of the bilateral knees (weightbearing AP, weightbearing Palomares, weightbearing lateral, and Sunrise) were interpreted by me and reviewed with the patient.\par \par Location of imaging: [facility]\par Date of exam: 5/20/22\par \par Pelvic alignment: normal\par \par Right hip -- \par Arthritis: moderate\par Deformity: pincer\par Osteonecrosis: none\par \par Left hip -- \par Arthritis: moderate\par Deformity: pincer\par Osteonecrosis: none\par \par Right knee -- radiolucent line transverse across the patella best seen on the lateral view\par Alignment: normal\par Arthritis: none\par Patellar height: normal\par Patellar tracking: central\par \par Left knee -- \par Alignment: normal\par Arthritis: none\par Patellar height: normal\par Patellar tracking: central

## 2022-05-23 NOTE — DISCUSSION/SUMMARY
[de-identified] : 67 y/o female with left > right hip osteoarthritis, left knee chondromalacia patella, likely healed remote right patella fracture\par - Referral for OPT\par - Tylenol and Meloxicam as needed\par - Encouraged weight loss\par - Education on home exercise program\par - F/U in 2 months, no new XRs needed. Consider L ASIM if not sufficiently improved with conservative measures

## 2022-05-23 NOTE — HISTORY OF PRESENT ILLNESS
[___ yrs] : [unfilled] year(s) ago [2] : a current pain level of 2/10 [Intermit.] : ~He/She~ states the symptoms seem to be intermittent [Rest] : relieved by rest [de-identified] : 5/20/22: 67 y/o female presents with left hip and knee pain as well as low back pain. All symptoms have been progressive since being hospitalized with pneumonia about a year ago. Does note that she has had less severe pain since pre-pandemic and relative sedentary lifestyle has generally made everything worse. Localizes pain to the anterior and posterior aspects of the knee, and to the anterior and lateral aspects of the left hip. She notes progressive left hip stiffness. Treatments thus far have included PT, HEP, Voltaren gel, Aleve, and activity modification. \par \par Upon questioning, she also reports hx of right knee injury in 2020 while walking her dog; fell directly onto the kneecap with pain and swelling but never sought medical attention.  [Walking] : worsened by walking [de-identified] : describes cramping pain.

## 2022-07-06 ENCOUNTER — APPOINTMENT (OUTPATIENT)
Dept: INTERNAL MEDICINE | Facility: CLINIC | Age: 67
End: 2022-07-06

## 2022-07-06 ENCOUNTER — RESULT REVIEW (OUTPATIENT)
Age: 67
End: 2022-07-06

## 2022-07-06 VITALS
WEIGHT: 212 LBS | DIASTOLIC BLOOD PRESSURE: 72 MMHG | HEIGHT: 67 IN | BODY MASS INDEX: 33.27 KG/M2 | OXYGEN SATURATION: 95 % | TEMPERATURE: 96.8 F | HEART RATE: 78 BPM | SYSTOLIC BLOOD PRESSURE: 110 MMHG

## 2022-07-06 DIAGNOSIS — E87.5 HYPERKALEMIA: ICD-10-CM

## 2022-07-06 DIAGNOSIS — R10.9 UNSPECIFIED ABDOMINAL PAIN: ICD-10-CM

## 2022-07-06 PROCEDURE — 36415 COLL VENOUS BLD VENIPUNCTURE: CPT

## 2022-07-06 PROCEDURE — 99214 OFFICE O/P EST MOD 30 MIN: CPT | Mod: 25

## 2022-07-07 ENCOUNTER — NON-APPOINTMENT (OUTPATIENT)
Age: 67
End: 2022-07-07

## 2022-07-07 LAB
ANION GAP SERPL CALC-SCNC: 12 MMOL/L
BUN SERPL-MCNC: 22 MG/DL
CALCIUM SERPL-MCNC: 9.9 MG/DL
CHLORIDE SERPL-SCNC: 103 MMOL/L
CHOLEST SERPL-MCNC: 244 MG/DL
CO2 SERPL-SCNC: 25 MMOL/L
CREAT SERPL-MCNC: 0.87 MG/DL
EGFR: 73 ML/MIN/1.73M2
ESTIMATED AVERAGE GLUCOSE: 137 MG/DL
GLUCOSE SERPL-MCNC: 126 MG/DL
HBA1C MFR BLD HPLC: 6.4 %
HDLC SERPL-MCNC: 52 MG/DL
LDLC SERPL CALC-MCNC: 151 MG/DL
NONHDLC SERPL-MCNC: 192 MG/DL
POTASSIUM SERPL-SCNC: 4.5 MMOL/L
SODIUM SERPL-SCNC: 141 MMOL/L
TRIGL SERPL-MCNC: 205 MG/DL

## 2022-07-22 ENCOUNTER — APPOINTMENT (OUTPATIENT)
Dept: ORTHOPEDIC SURGERY | Facility: CLINIC | Age: 67
End: 2022-07-22

## 2022-07-22 VITALS
HEIGHT: 67 IN | OXYGEN SATURATION: 97 % | WEIGHT: 212 LBS | SYSTOLIC BLOOD PRESSURE: 149 MMHG | HEART RATE: 75 BPM | DIASTOLIC BLOOD PRESSURE: 75 MMHG | BODY MASS INDEX: 33.27 KG/M2

## 2022-07-22 VITALS — HEART RATE: 79 BPM | SYSTOLIC BLOOD PRESSURE: 112 MMHG | DIASTOLIC BLOOD PRESSURE: 72 MMHG | OXYGEN SATURATION: 95 %

## 2022-07-22 DIAGNOSIS — M16.0 BILATERAL PRIMARY OSTEOARTHRITIS OF HIP: ICD-10-CM

## 2022-07-22 PROCEDURE — 99213 OFFICE O/P EST LOW 20 MIN: CPT

## 2022-07-25 ENCOUNTER — APPOINTMENT (OUTPATIENT)
Dept: ULTRASOUND IMAGING | Facility: HOSPITAL | Age: 67
End: 2022-07-25

## 2022-07-25 ENCOUNTER — OUTPATIENT (OUTPATIENT)
Dept: OUTPATIENT SERVICES | Facility: HOSPITAL | Age: 67
LOS: 1 days | End: 2022-07-25
Payer: COMMERCIAL

## 2022-07-25 DIAGNOSIS — Z78.9 OTHER SPECIFIED HEALTH STATUS: Chronic | ICD-10-CM

## 2022-07-25 PROBLEM — M16.0 PRIMARY OSTEOARTHRITIS OF BOTH HIPS: Status: ACTIVE | Noted: 2022-05-20

## 2022-07-25 PROCEDURE — 76705 ECHO EXAM OF ABDOMEN: CPT | Mod: 26

## 2022-07-25 PROCEDURE — 76705 ECHO EXAM OF ABDOMEN: CPT

## 2022-07-25 NOTE — PHYSICAL EXAM
[de-identified] : General appearance: well nourished and hydrated, pleasant, alert and oriented x 3, cooperative.  Obese habitus.\par HEENT: normocephalic, EOM intact, wearing mask, external auditory canal clear.  \par Cardiovascular: no lower leg edema, no varicosities, dorsalis pedis pulses palpable and symmetric.  \par Lymphatics: no palpable lymphadenopathy, no lymphedema.  \par Neurologic: sensation is normal, no muscle weakness in upper or lower extremities, patella tendon reflexes present and symmetric.  \par Dermatologic: skin moist, warm, no rash.  \par Spine: cervical spine with normal lordosis and painless range of motion, thoracic spine with normal kyphosis and painless range of motion, lumbosacral spine with normal lordosis and stiff restricted range of motion.  Tenderness to palpation along midline lumbar spine and paraspinal musculature.  Sacroiliac joints tender bilaterally. Negative SLR and crossed SLR tests bilaterally.\par Gait: normal\par  \par Left hip: all fields negative/normal/unremarkable unless otherwise noted --\par - Focal soft tissue swelling: \par - Ecchymosis: \par - Erythema: \par - Wounds: \par - Tenderness: \par - ROM:\par   - Flexion: 100\par   - Extension: 0\par   - Adduction: 10\par   - Abduction: 25\par   - Internal rotation in 90 degrees of hip flexion: 10\par   - External rotation in 90 degrees of hip flexion: 25\par - JCARLOS: \par - FADIR: \par - Fortunato: \par - Stinchfield: \par - Flexor power: 5/5\par - Abductor power: 5/5\par \par Right hip: all fields negative/normal/unremarkable unless otherwise noted --\par - Focal soft tissue swelling: \par - Ecchymosis: \par - Erythema: \par - Wounds: \par - Tenderness: \par - ROM:\par   - Flexion: 100\par   - Extension: 0\par   - Adduction: 10\par   - Abduction: 25\par   - Internal rotation in 90 degrees of hip flexion: 10\par   - External rotation in 90 degrees of hip flexion: 45\par - JCARLOS: \par - FADIR: \par - Fortunato: \par - Stinchfield: \par - Flexor power: 5/5\par - Abductor power: 5/5

## 2022-07-25 NOTE — HISTORY OF PRESENT ILLNESS
[de-identified] : 7/22/22: 67 y/o female presents for followup of L hip ostearthritis. She states her hip has been improving since her last visit. Reports PT was not covered by her insurance so she has been exercising at her local gym. She takes Tylenol and Meloxicam as needed. She feels that she is doing well and still making forward progress.\par \par 5/20/22: 67 y/o female presents with left hip and knee pain as well as low back pain. All symptoms have been progressive since being hospitalized with pneumonia about a year ago. Does note that she has had less severe pain since pre-pandemic and relative sedentary lifestyle has generally made everything worse. Localizes pain to the anterior and posterior aspects of the knee, and to the anterior and lateral aspects of the left hip. She notes progressive left hip stiffness. Treatments thus far have included PT, HEP, Voltaren gel, Aleve, and activity modification. \par \par Upon questioning, she also reports hx of right knee injury in 2020 while walking her dog; fell directly onto the kneecap with pain and swelling but never sought medical attention.

## 2022-07-25 NOTE — DISCUSSION/SUMMARY
[de-identified] : 67 y/o female with moderate left hip osteoarthritis; symptoms improved following conservative treatment\par - Cont Tylenol and Meloxicam as needed\par - Encouraged weight loss\par - Cont HEP\par - F/U as needed

## 2022-08-01 ENCOUNTER — APPOINTMENT (OUTPATIENT)
Dept: MRI IMAGING | Facility: HOSPITAL | Age: 67
End: 2022-08-01

## 2022-08-01 ENCOUNTER — RESULT REVIEW (OUTPATIENT)
Age: 67
End: 2022-08-01

## 2022-08-01 ENCOUNTER — OUTPATIENT (OUTPATIENT)
Dept: OUTPATIENT SERVICES | Facility: HOSPITAL | Age: 67
LOS: 1 days | End: 2022-08-01
Payer: COMMERCIAL

## 2022-08-01 DIAGNOSIS — Z78.9 OTHER SPECIFIED HEALTH STATUS: Chronic | ICD-10-CM

## 2022-08-01 PROCEDURE — A9585: CPT

## 2022-08-01 PROCEDURE — 74183 MRI ABD W/O CNTR FLWD CNTR: CPT | Mod: 26,MH

## 2022-08-01 PROCEDURE — 74183 MRI ABD W/O CNTR FLWD CNTR: CPT

## 2022-08-02 ENCOUNTER — NON-APPOINTMENT (OUTPATIENT)
Age: 67
End: 2022-08-02

## 2022-08-03 NOTE — PHYSICAL THERAPY INITIAL EVALUATION ADULT - GAIT DEVIATIONS NOTED, PT EVAL
Please call patient and let them know that their recent ultrasound is stable with no concerning lesions/masses within the liver. I will see them at their next follow up appointment.
decreased darrick/decreased step length/decreased stride length

## 2023-01-11 ENCOUNTER — APPOINTMENT (OUTPATIENT)
Dept: INTERNAL MEDICINE | Facility: CLINIC | Age: 68
End: 2023-01-11
Payer: MEDICARE

## 2023-01-11 ENCOUNTER — NON-APPOINTMENT (OUTPATIENT)
Age: 68
End: 2023-01-11

## 2023-01-11 DIAGNOSIS — R19.7 DIARRHEA, UNSPECIFIED: ICD-10-CM

## 2023-01-11 DIAGNOSIS — R06.09 OTHER FORMS OF DYSPNEA: ICD-10-CM

## 2023-01-11 DIAGNOSIS — R53.83 OTHER FATIGUE: ICD-10-CM

## 2023-01-11 PROCEDURE — 99214 OFFICE O/P EST MOD 30 MIN: CPT | Mod: 95

## 2023-01-18 LAB
ALBUMIN SERPL ELPH-MCNC: 4 G/DL
ALP BLD-CCNC: 54 U/L
ALT SERPL-CCNC: 18 U/L
ANION GAP SERPL CALC-SCNC: 11 MMOL/L
AST SERPL-CCNC: 19 U/L
BASOPHILS # BLD AUTO: 0.02 K/UL
BASOPHILS NFR BLD AUTO: 0.4 %
BILIRUB SERPL-MCNC: 0.6 MG/DL
BUN SERPL-MCNC: 16 MG/DL
CALCIUM SERPL-MCNC: 9.3 MG/DL
CHLORIDE SERPL-SCNC: 103 MMOL/L
CHOLEST SERPL-MCNC: 245 MG/DL
CO2 SERPL-SCNC: 25 MMOL/L
CREAT SERPL-MCNC: 0.94 MG/DL
EGFR: 66 ML/MIN/1.73M2
EOSINOPHIL # BLD AUTO: 0.03 K/UL
EOSINOPHIL NFR BLD AUTO: 0.6 %
ESTIMATED AVERAGE GLUCOSE: 120 MG/DL
GLUCOSE SERPL-MCNC: 119 MG/DL
HBA1C MFR BLD HPLC: 5.8 %
HCT VFR BLD CALC: 42.3 %
HDLC SERPL-MCNC: 52 MG/DL
HGB BLD-MCNC: 13.7 G/DL
IMM GRANULOCYTES NFR BLD AUTO: 0.2 %
LDLC SERPL CALC-MCNC: 164 MG/DL
LYMPHOCYTES # BLD AUTO: 1.06 K/UL
LYMPHOCYTES NFR BLD AUTO: 21.1 %
MAN DIFF?: NORMAL
MCHC RBC-ENTMCNC: 29.9 PG
MCHC RBC-ENTMCNC: 32.4 GM/DL
MCV RBC AUTO: 92.4 FL
MONOCYTES # BLD AUTO: 0.47 K/UL
MONOCYTES NFR BLD AUTO: 9.4 %
NEUTROPHILS # BLD AUTO: 3.43 K/UL
NEUTROPHILS NFR BLD AUTO: 68.3 %
NONHDLC SERPL-MCNC: 192 MG/DL
PLATELET # BLD AUTO: 383 K/UL
POTASSIUM SERPL-SCNC: 4.2 MMOL/L
PROT SERPL-MCNC: 6.8 G/DL
RBC # BLD: 4.58 M/UL
RBC # FLD: 13.6 %
SODIUM SERPL-SCNC: 140 MMOL/L
TRIGL SERPL-MCNC: 140 MG/DL
TSH SERPL-ACNC: 0.83 UIU/ML
VIT B12 SERPL-MCNC: 343 PG/ML
WBC # FLD AUTO: 5.02 K/UL

## 2023-01-26 NOTE — PATIENT PROFILE ADULT - NSPROEDALEARNPREF_GEN_A_NUR
Pt cleaned of large amount very loose light brown stool. Repositioned for comfort.   verbal instruction/individual instruction

## 2023-03-10 ENCOUNTER — NON-APPOINTMENT (OUTPATIENT)
Age: 68
End: 2023-03-10

## 2023-03-14 ENCOUNTER — APPOINTMENT (OUTPATIENT)
Dept: INTERNAL MEDICINE | Facility: CLINIC | Age: 68
End: 2023-03-14
Payer: MEDICARE

## 2023-03-14 VITALS
TEMPERATURE: 97.6 F | BODY MASS INDEX: 32.33 KG/M2 | DIASTOLIC BLOOD PRESSURE: 76 MMHG | OXYGEN SATURATION: 98 % | WEIGHT: 206 LBS | HEIGHT: 67 IN | HEART RATE: 86 BPM | SYSTOLIC BLOOD PRESSURE: 111 MMHG

## 2023-03-14 DIAGNOSIS — R10.13 EPIGASTRIC PAIN: ICD-10-CM

## 2023-03-14 DIAGNOSIS — M25.552 PAIN IN LEFT HIP: ICD-10-CM

## 2023-03-14 PROCEDURE — 99214 OFFICE O/P EST MOD 30 MIN: CPT

## 2023-03-15 LAB — UREA BREATH TEST QL: NEGATIVE

## 2023-04-18 ENCOUNTER — APPOINTMENT (OUTPATIENT)
Dept: GASTROENTEROLOGY | Facility: CLINIC | Age: 68
End: 2023-04-18
Payer: MEDICARE

## 2023-04-18 VITALS
RESPIRATION RATE: 14 BRPM | HEIGHT: 67 IN | OXYGEN SATURATION: 96 % | BODY MASS INDEX: 32.18 KG/M2 | SYSTOLIC BLOOD PRESSURE: 143 MMHG | DIASTOLIC BLOOD PRESSURE: 83 MMHG | TEMPERATURE: 96.4 F | HEART RATE: 61 BPM | WEIGHT: 205 LBS

## 2023-04-18 DIAGNOSIS — Z12.11 ENCOUNTER FOR SCREENING FOR MALIGNANT NEOPLASM OF COLON: ICD-10-CM

## 2023-04-18 DIAGNOSIS — R14.0 ABDOMINAL DISTENSION (GASEOUS): ICD-10-CM

## 2023-04-18 PROCEDURE — 99203 OFFICE O/P NEW LOW 30 MIN: CPT | Mod: GC

## 2023-04-18 NOTE — REASON FOR VISIT
[Initial Evaluation] : an initial evaluation [FreeTextEntry1] : abdominal bloating and CRC screening

## 2023-04-18 NOTE — HISTORY OF PRESENT ILLNESS
[FreeTextEntry1] : 68 yo female with hx obesity, HPL, b/l PNA c/b sepsis, L hip OA here for evaluation of abdominal bloating and CRC screening.\par Pt states she started to experience abdominal bloating and distension for the past 2 years, with some reduced appetite, however weight has been stable and denied dysphagia. Denies significant gas. Occasional constipation.\par Testing with PMD including CBC, CMP and HP breath test were mostly normal. Prior CT/MRI and US reviewed with significant pathology to explain bloating. Likes to eat cheese.\par Patient denies any nausea, vomiting, diarrhea, constipation, blood in stools.\par \par Last colonoscopy: never\par CT colonography in 2019 in [facility] - normal\par Last EGD: never\par \par FH: no FH of CRC or stomach ca\par SH: denies smoking, EtoH: occasional\par

## 2023-04-18 NOTE — PHYSICAL EXAM
[Alert] : alert [Well Developed] : well developed [No Respiratory Distress] : no respiratory distress [Abdomen Tenderness] : non-tender [Abdomen Soft] : soft [Oriented To Time, Place, And Person] : oriented to person, place, and time

## 2023-04-18 NOTE — ASSESSMENT
[FreeTextEntry1] : 66 yo female with hx obesity, HPL, b/l PNA c/b sepsis, L hip OA here for evaluation of abdominal bloating and CRC screening.\par \par # Bloating/distension\par - trial of IBgard\par - OTC fiber\par - increase dietary fluid and water intake\par - avoid dairy\par - schedule EGD with celiac biopsies\par \par # Average risk CRC screening:\par - schedule for screening colonoscopy at [facility] (pt preference)\par - Indications/risk/benefits/alternatives discussed\par - Need for bowel prep, prior covid test and escort on the day of the procedure discussed\par - follow up after colonoscopy depending on the findings\par

## 2023-04-20 ENCOUNTER — APPOINTMENT (OUTPATIENT)
Dept: OBGYN | Facility: CLINIC | Age: 68
End: 2023-04-20
Payer: MEDICARE

## 2023-04-20 VITALS
HEIGHT: 67 IN | SYSTOLIC BLOOD PRESSURE: 128 MMHG | OXYGEN SATURATION: 95 % | WEIGHT: 207 LBS | DIASTOLIC BLOOD PRESSURE: 75 MMHG | BODY MASS INDEX: 32.49 KG/M2 | HEART RATE: 70 BPM

## 2023-04-20 DIAGNOSIS — R10.2 PELVIC AND PERINEAL PAIN: ICD-10-CM

## 2023-04-20 DIAGNOSIS — Z01.419 ENCOUNTER FOR GYNECOLOGICAL EXAMINATION (GENERAL) (ROUTINE) W/OUT ABNORMAL FINDINGS: ICD-10-CM

## 2023-04-20 PROCEDURE — 99202 OFFICE O/P NEW SF 15 MIN: CPT | Mod: 25

## 2023-04-20 PROCEDURE — G0101: CPT

## 2023-04-24 ENCOUNTER — APPOINTMENT (OUTPATIENT)
Dept: INTERNAL MEDICINE | Facility: CLINIC | Age: 68
End: 2023-04-24
Payer: MEDICARE

## 2023-04-24 VITALS
BODY MASS INDEX: 32.49 KG/M2 | TEMPERATURE: 97.2 F | SYSTOLIC BLOOD PRESSURE: 110 MMHG | OXYGEN SATURATION: 96 % | WEIGHT: 207 LBS | HEART RATE: 79 BPM | DIASTOLIC BLOOD PRESSURE: 71 MMHG | HEIGHT: 67 IN

## 2023-04-24 DIAGNOSIS — Z00.00 ENCOUNTER FOR GENERAL ADULT MEDICAL EXAMINATION W/OUT ABNORMAL FINDINGS: ICD-10-CM

## 2023-04-24 PROCEDURE — 36415 COLL VENOUS BLD VENIPUNCTURE: CPT

## 2023-04-24 PROCEDURE — G0439: CPT

## 2023-04-25 LAB
ALBUMIN SERPL ELPH-MCNC: 4.4 G/DL
ALP BLD-CCNC: 58 U/L
ALT SERPL-CCNC: 21 U/L
ANION GAP SERPL CALC-SCNC: 12 MMOL/L
AST SERPL-CCNC: 20 U/L
BACTERIA UR CULT: NORMAL
BASOPHILS # BLD AUTO: 0.05 K/UL
BASOPHILS NFR BLD AUTO: 0.6 %
BILIRUB SERPL-MCNC: 0.2 MG/DL
BUN SERPL-MCNC: 21 MG/DL
CALCIUM SERPL-MCNC: 9.6 MG/DL
CHLORIDE SERPL-SCNC: 102 MMOL/L
CHOLEST SERPL-MCNC: 266 MG/DL
CO2 SERPL-SCNC: 25 MMOL/L
CREAT SERPL-MCNC: 1.33 MG/DL
EGFR: 43 ML/MIN/1.73M2
EOSINOPHIL # BLD AUTO: 0.1 K/UL
EOSINOPHIL NFR BLD AUTO: 1.3 %
ESTIMATED AVERAGE GLUCOSE: 123 MG/DL
GLUCOSE SERPL-MCNC: 96 MG/DL
HBA1C MFR BLD HPLC: 5.9 %
HCT VFR BLD CALC: 44.7 %
HDLC SERPL-MCNC: 51 MG/DL
HGB BLD-MCNC: 13.6 G/DL
HPV HIGH+LOW RISK DNA PNL CVX: NOT DETECTED
IMM GRANULOCYTES NFR BLD AUTO: 0.5 %
LDLC SERPL CALC-MCNC: 144 MG/DL
LYMPHOCYTES # BLD AUTO: 1.65 K/UL
LYMPHOCYTES NFR BLD AUTO: 21.2 %
MAN DIFF?: NORMAL
MCHC RBC-ENTMCNC: 29.2 PG
MCHC RBC-ENTMCNC: 30.4 GM/DL
MCV RBC AUTO: 95.9 FL
MONOCYTES # BLD AUTO: 0.57 K/UL
MONOCYTES NFR BLD AUTO: 7.3 %
NEUTROPHILS # BLD AUTO: 5.39 K/UL
NEUTROPHILS NFR BLD AUTO: 69.1 %
NONHDLC SERPL-MCNC: 216 MG/DL
PLATELET # BLD AUTO: 409 K/UL
POTASSIUM SERPL-SCNC: 5.6 MMOL/L
PROT SERPL-MCNC: 7.6 G/DL
RBC # BLD: 4.66 M/UL
RBC # FLD: 13.5 %
SODIUM SERPL-SCNC: 139 MMOL/L
TRIGL SERPL-MCNC: 357 MG/DL
WBC # FLD AUTO: 7.8 K/UL

## 2023-04-25 NOTE — PLAN
[FreeTextEntry1] : review of CT; no abn finding;\par will get pelvic usg\par pt has appointment with gi \par mammo, usg/ pap done

## 2023-04-25 NOTE — HISTORY OF PRESENT ILLNESS
[Patient reported bone density results were abnormal] : Patient reported bone density results were abnormal [LMP unknown] : LMP unknown [postmenopausal] : postmenopausal [N] : Patient denies prior pregnancies [Hot Flashes] : hot flashes [Menarche Age: ____] : age at menarche was [unfilled] [Patient reported mammogram was abnormal] : Patient reported mammogram was abnormal [FreeTextEntry1] : 68 yo patient presents for well women visit. She has been having abdominal pain for a few months.She currently has urination frequency with very little urine coming out. She states she was hospitalized a few years ago and was unconscious for a few days before she was found and hospitalized and thinks the abdominal urinary issues may be a result from that incident. Patient states she has not had a gynecological exam in many years. \par having more abdominal pain;\par bruise on breast on right\par pt showing pain usually right mid abd area [Mammogramdate] : 2017 [TextBox_19] : bx ; neg;  [BoneDensityDate] : 05/10/21 [TextBox_37] : Osteopenia

## 2023-04-25 NOTE — PHYSICAL EXAM
[Chaperone Present] : A chaperone was present in the examining room during all aspects of the physical examination [Appropriately responsive] : appropriately responsive [Alert] : alert [No Acute Distress] : no acute distress [No Lymphadenopathy] : no lymphadenopathy [No Murmurs] : no murmurs [Soft] : soft [Non-tender] : non-tender [Non-distended] : non-distended [No HSM] : No HSM [No Lesions] : no lesions [No Mass] : no mass [Oriented x3] : oriented x3 [FreeTextEntry7] : no guarding; no rebound [Examination Of The Breasts] : a normal appearance [No Masses] : no breast masses were palpable [Labia Majora] : normal [Labia Minora] : normal [Normal] : normal [Uterine Adnexae] : normal

## 2023-04-28 LAB — CYTOLOGY CVX/VAG DOC THIN PREP: ABNORMAL

## 2023-05-05 ENCOUNTER — NON-APPOINTMENT (OUTPATIENT)
Age: 68
End: 2023-05-05

## 2023-05-11 ENCOUNTER — APPOINTMENT (OUTPATIENT)
Age: 68
End: 2023-05-11
Payer: MEDICARE

## 2023-05-11 ENCOUNTER — RESULT REVIEW (OUTPATIENT)
Age: 68
End: 2023-05-11

## 2023-05-11 PROCEDURE — 43239 EGD BIOPSY SINGLE/MULTIPLE: CPT

## 2023-05-11 PROCEDURE — 45385 COLONOSCOPY W/LESION REMOVAL: CPT

## 2023-05-11 PROCEDURE — 45380 COLONOSCOPY AND BIOPSY: CPT | Mod: 59

## 2023-05-18 ENCOUNTER — NON-APPOINTMENT (OUTPATIENT)
Age: 68
End: 2023-05-18

## 2023-08-08 NOTE — ED ADULT NURSE NOTE - NS ED NURSE IV DC DT
Called patient: A new authorization was sent to: [email or web address]    [facility] She is going to return the authorization for [employer].    06-Oct-2021 17:25

## 2023-11-13 ENCOUNTER — APPOINTMENT (OUTPATIENT)
Dept: INTERNAL MEDICINE | Facility: CLINIC | Age: 68
End: 2023-11-13
Payer: MEDICARE

## 2023-11-13 ENCOUNTER — OUTPATIENT (OUTPATIENT)
Dept: OUTPATIENT SERVICES | Facility: HOSPITAL | Age: 68
LOS: 1 days | End: 2023-11-13
Payer: MEDICARE

## 2023-11-13 DIAGNOSIS — R05.9 COUGH, UNSPECIFIED: ICD-10-CM

## 2023-11-13 DIAGNOSIS — Z78.9 OTHER SPECIFIED HEALTH STATUS: Chronic | ICD-10-CM

## 2023-11-13 PROCEDURE — 71046 X-RAY EXAM CHEST 2 VIEWS: CPT | Mod: 26

## 2023-11-13 PROCEDURE — 99442: CPT

## 2023-11-13 PROCEDURE — 71046 X-RAY EXAM CHEST 2 VIEWS: CPT

## 2024-01-01 NOTE — DISCHARGE NOTE NURSING/CASE MANAGEMENT/SOCIAL WORK - NSDPDISTO_GEN_ALL_CORE
Sub-Acute rehab First name: Juan       MR # [id number]  Date of Birth: [birth year]	Time of Birth: 19:09    Birth Weight: 1030g     Date of Admission: 7/26/24          Gestational Age: 31.5    Active Diagnoses: Prematurity, VLBW, RDS, poor feeding, IUGR, SGA, mono/di twin, apnea of prematurity, anemia of prematurity, feeding difficulties, FTT, hyponatremia, pneumonia, ASD  Resolved Diagnoses: Thrombocytopenia, hyperbilirubinemia, r/o sepsis    ICU Vital Signs Last 24 Hrs  T(C): 37.1 (16 Aug 2024 14:00), Max: 37.3 (15 Aug 2024 23:00)  T(F): 98.7 (16 Aug 2024 14:00), Max: 99.1 (15 Aug 2024 23:00)  HR: 164 (16 Aug 2024 15:00) (148 - 184)  BP: 59/39 (16 Aug 2024 08:00) (59/39 - 73/44)  BP(mean): 46 (16 Aug 2024 08:00) (46 - 67)  ABP: --  ABP(mean): --  RR: 67 (16 Aug 2024 15:00) (24 - 67)  SpO2: 90% (16 Aug 2024 15:00) (30% - 97%)    O2 Parameters below as of 16 Aug 2024 15:00  Patient On (Oxygen Delivery Method): conventional ventilator    O2 Concentration (%): 40      Interval Events: He remains intubated on SIMV 22/6, rate 30, PS 10 and FiO2 0.40. CXR repeated today and still shows bilateral opacities, right lower lobe slightly improved but right upper lobe continues to show opacity. Bubbly lucency persists, but moving, abdominal examination benign, will follow in PM x-ray.   He continues to require same respiratory support but is more labile today, CBC repeated which shows hematocrit of 29. in view of persistent o2 requirement and need for mechanical ventilation, will transfuse PRBC. He is tolerating gavage feeds, but will keep NPO before and after transfusion. He gained 70 grams. C Parents updated over phone.     Mode: SIMV with PS  RR (machine): 30  FiO2: 38  PEEP: 6  PS: 10  ITime: 0.45  MAP: 10  PC: 22  PIP: 22    WEIGHT: 1500 grams, increased 70 grams     FLUIDS AND NUTRITION:     I&O's Detail    15 Aug 2024 07:01  -  16 Aug 2024 07:00  --------------------------------------------------------  IN:    Tube Feeding Fluid: 196 mL  Total IN: 196 mL    OUT:    Voided (mL): 25 mL  Total OUT: 25 mL    Total NET: 171 mL      16 Aug 2024 07:01  -  16 Aug 2024 15:31  --------------------------------------------------------  IN:    dextrose 10% w/ Additives  (grecia): 15 mL    Tube Feeding Fluid: 30 mL  Total IN: 45 mL    OUT:  Total OUT: 0 mL    Total NET: 45 mL      Diet - Enteral: EBM or DBM, HMF24, 28 cc every three hours via OG     PHYSICAL EXAM:  General: Alert, pink, vigorous  Chest/Lungs: Breath sounds equal to auscultation, course; No retractions  CV: No murmurs appreciated, normal pulses bilaterally  Abdomen: Soft nontender nondistended, no masses, bowel sounds present  Neuro exam: Appropriate tone, activity

## 2024-01-09 ENCOUNTER — APPOINTMENT (OUTPATIENT)
Dept: INTERNAL MEDICINE | Facility: CLINIC | Age: 69
End: 2024-01-09
Payer: MEDICARE

## 2024-01-09 VITALS
HEART RATE: 63 BPM | SYSTOLIC BLOOD PRESSURE: 131 MMHG | HEIGHT: 67 IN | BODY MASS INDEX: 31.71 KG/M2 | TEMPERATURE: 97.7 F | WEIGHT: 202 LBS | DIASTOLIC BLOOD PRESSURE: 82 MMHG | OXYGEN SATURATION: 95 %

## 2024-01-09 DIAGNOSIS — R10.2 PELVIC AND PERINEAL PAIN: ICD-10-CM

## 2024-01-09 DIAGNOSIS — E78.5 HYPERLIPIDEMIA, UNSPECIFIED: ICD-10-CM

## 2024-01-09 DIAGNOSIS — R35.0 FREQUENCY OF MICTURITION: ICD-10-CM

## 2024-01-09 DIAGNOSIS — R73.03 PREDIABETES.: ICD-10-CM

## 2024-01-09 PROCEDURE — 99214 OFFICE O/P EST MOD 30 MIN: CPT

## 2024-01-12 ENCOUNTER — LABORATORY RESULT (OUTPATIENT)
Age: 69
End: 2024-01-12

## 2024-01-12 ENCOUNTER — APPOINTMENT (OUTPATIENT)
Dept: INTERNAL MEDICINE | Facility: CLINIC | Age: 69
End: 2024-01-12
Payer: MEDICARE

## 2024-01-12 PROCEDURE — 81003 URINALYSIS AUTO W/O SCOPE: CPT | Mod: QW

## 2024-01-12 PROCEDURE — 36415 COLL VENOUS BLD VENIPUNCTURE: CPT

## 2024-01-16 DIAGNOSIS — R31.9 HEMATURIA, UNSPECIFIED: ICD-10-CM

## 2024-01-16 LAB
ALBUMIN SERPL ELPH-MCNC: 4.2 G/DL
ALP BLD-CCNC: 57 U/L
ALT SERPL-CCNC: 18 U/L
ANION GAP SERPL CALC-SCNC: 13 MMOL/L
APPEARANCE: CLEAR
AST SERPL-CCNC: 17 U/L
BILIRUB SERPL-MCNC: 0.6 MG/DL
BILIRUBIN URINE: NEGATIVE
BLOOD URINE: ABNORMAL
BUN SERPL-MCNC: 18 MG/DL
CALCIUM SERPL-MCNC: 9.1 MG/DL
CHLORIDE SERPL-SCNC: 104 MMOL/L
CHOLEST SERPL-MCNC: 262 MG/DL
CO2 SERPL-SCNC: 24 MMOL/L
COLOR: YELLOW
CREAT SERPL-MCNC: 0.87 MG/DL
EGFR: 72 ML/MIN/1.73M2
ESTIMATED AVERAGE GLUCOSE: 117 MG/DL
GLUCOSE QUALITATIVE U: NEGATIVE MG/DL
GLUCOSE SERPL-MCNC: 84 MG/DL
HBA1C MFR BLD HPLC: 5.7 %
HDLC SERPL-MCNC: 52 MG/DL
KETONES URINE: NEGATIVE MG/DL
LDLC SERPL CALC-MCNC: 178 MG/DL
LEUKOCYTE ESTERASE URINE: ABNORMAL
NITRITE URINE: NEGATIVE
NONHDLC SERPL-MCNC: 210 MG/DL
PH URINE: 6.5
POTASSIUM SERPL-SCNC: 4.8 MMOL/L
PROT SERPL-MCNC: 7.2 G/DL
PROTEIN URINE: NORMAL MG/DL
SODIUM SERPL-SCNC: 141 MMOL/L
SPECIFIC GRAVITY URINE: 1.02
TRIGL SERPL-MCNC: 175 MG/DL
UROBILINOGEN URINE: 1 MG/DL

## 2024-08-15 NOTE — OCCUPATIONAL THERAPY INITIAL EVALUATION ADULT - LEVEL OF INDEPENDENCE: SIT/STAND, REHAB EVAL
[NI] : Normal [de-identified] : NAD, AxOx3  [de-identified] : nonlabored breathing [de-identified] : normal HR contact guard [de-identified] : RIGHT: SN-N 24.5, N-IMF 7.5, BW 12 LEFT: SN-N 27, N-IMF 7.5, BW 14 Left breast grade 3 ptosis  Right breast with tethered scar lateral aspect with associated hollowing No ptosis to the right breast  Minimal RT changes right breast  [de-identified] : no edema  [de-identified] : as above [de-identified] : grossly intact  [de-identified] : normal affect

## 2024-12-12 NOTE — ED PROVIDER NOTE - NS ED MD DISPO DISCHARGE
Provider: DR. HOPSON    Caller: Nataliia Lr    Relationship to Patient: Self    Pharmacy:     Phone Number: [phone]    Reason for Call: APPOINTMENT     When was the patient last seen: 03.13.24    PATIENT WOULD LIKE TO RETURN TO SEE DR. HOPSON. THERE IS A MESSAGE FROM 12.09.24 IN CHART PLEASE SEE.     PATIENT CAN BE REACHED AT  [phone]    THANK YOU   Home

## 2025-02-26 ENCOUNTER — APPOINTMENT (OUTPATIENT)
Dept: INTERNAL MEDICINE | Facility: CLINIC | Age: 70
End: 2025-02-26
Payer: MEDICARE

## 2025-02-26 VITALS
HEIGHT: 67 IN | WEIGHT: 193 LBS | DIASTOLIC BLOOD PRESSURE: 73 MMHG | SYSTOLIC BLOOD PRESSURE: 111 MMHG | TEMPERATURE: 97.4 F | HEART RATE: 83 BPM | OXYGEN SATURATION: 94 % | BODY MASS INDEX: 30.29 KG/M2

## 2025-02-26 DIAGNOSIS — Z00.01 ENCOUNTER FOR GENERAL ADULT MEDICAL EXAMINATION WITH ABNORMAL FINDINGS: ICD-10-CM

## 2025-02-26 DIAGNOSIS — E78.5 HYPERLIPIDEMIA, UNSPECIFIED: ICD-10-CM

## 2025-02-26 DIAGNOSIS — L98.9 DISORDER OF THE SKIN AND SUBCUTANEOUS TISSUE, UNSPECIFIED: ICD-10-CM

## 2025-02-26 DIAGNOSIS — E66.9 OBESITY, UNSPECIFIED: ICD-10-CM

## 2025-02-26 DIAGNOSIS — R73.03 PREDIABETES.: ICD-10-CM

## 2025-02-26 DIAGNOSIS — M85.80 OTHER SPECIFIED DISORDERS OF BONE DENSITY AND STRUCTURE, UNSPECIFIED SITE: ICD-10-CM

## 2025-02-26 DIAGNOSIS — H26.9 UNSPECIFIED CATARACT: ICD-10-CM

## 2025-02-26 PROCEDURE — G0439: CPT

## 2025-02-26 PROCEDURE — 99213 OFFICE O/P EST LOW 20 MIN: CPT | Mod: 25

## 2025-02-26 PROCEDURE — G0444 DEPRESSION SCREEN ANNUAL: CPT | Mod: 59

## 2025-02-27 LAB
25(OH)D3 SERPL-MCNC: 18 NG/ML
ALBUMIN SERPL ELPH-MCNC: 4.1 G/DL
ALP BLD-CCNC: 59 U/L
ALT SERPL-CCNC: 19 U/L
ANION GAP SERPL CALC-SCNC: 14 MMOL/L
AST SERPL-CCNC: 23 U/L
BASOPHILS # BLD AUTO: 0.05 K/UL
BASOPHILS NFR BLD AUTO: 0.9 %
BILIRUB SERPL-MCNC: 0.2 MG/DL
BUN SERPL-MCNC: 27 MG/DL
CALCIUM SERPL-MCNC: 9 MG/DL
CHLORIDE SERPL-SCNC: 104 MMOL/L
CHOLEST SERPL-MCNC: 259 MG/DL
CO2 SERPL-SCNC: 22 MMOL/L
CREAT SERPL-MCNC: 0.82 MG/DL
EGFR: 76 ML/MIN/1.73M2
EOSINOPHIL # BLD AUTO: 0.06 K/UL
EOSINOPHIL NFR BLD AUTO: 1 %
GLUCOSE SERPL-MCNC: 156 MG/DL
HCT VFR BLD CALC: 42.3 %
HDLC SERPL-MCNC: 54 MG/DL
HGB BLD-MCNC: 12.6 G/DL
IMM GRANULOCYTES NFR BLD AUTO: 0.5 %
LDLC SERPL CALC-MCNC: 171 MG/DL
LYMPHOCYTES # BLD AUTO: 1.27 K/UL
LYMPHOCYTES NFR BLD AUTO: 21.7 %
MAN DIFF?: NORMAL
MCHC RBC-ENTMCNC: 29.3 PG
MCHC RBC-ENTMCNC: 29.8 G/DL
MCV RBC AUTO: 98.4 FL
MONOCYTES # BLD AUTO: 0.44 K/UL
MONOCYTES NFR BLD AUTO: 7.5 %
NEUTROPHILS # BLD AUTO: 4.01 K/UL
NEUTROPHILS NFR BLD AUTO: 68.4 %
NONHDLC SERPL-MCNC: 205 MG/DL
PLATELET # BLD AUTO: 394 K/UL
POTASSIUM SERPL-SCNC: 5.5 MMOL/L
PROT SERPL-MCNC: 7.1 G/DL
RBC # BLD: 4.3 M/UL
RBC # FLD: 13.9 %
SODIUM SERPL-SCNC: 140 MMOL/L
TRIGL SERPL-MCNC: 182 MG/DL
TSH SERPL-ACNC: 1.82 UIU/ML
WBC # FLD AUTO: 5.86 K/UL

## 2025-02-28 LAB
ESTIMATED AVERAGE GLUCOSE: 126 MG/DL
HBA1C MFR BLD HPLC: 6 %

## 2025-04-03 ENCOUNTER — APPOINTMENT (OUTPATIENT)
Dept: DERMATOLOGY | Facility: CLINIC | Age: 70
End: 2025-04-03
Payer: MEDICARE

## 2025-04-03 DIAGNOSIS — D18.01 HEMANGIOMA OF SKIN AND SUBCUTANEOUS TISSUE: ICD-10-CM

## 2025-04-03 DIAGNOSIS — L82.0 INFLAMED SEBORRHEIC KERATOSIS: ICD-10-CM

## 2025-04-03 DIAGNOSIS — L82.1 OTHER SEBORRHEIC KERATOSIS: ICD-10-CM

## 2025-04-03 DIAGNOSIS — I83.90 ASYMPTOMATIC VARICOSE VEINS OF UNSPECIFIED LOWER EXTREMITY: ICD-10-CM

## 2025-04-03 DIAGNOSIS — D22.9 MELANOCYTIC NEVI, UNSPECIFIED: ICD-10-CM

## 2025-04-03 PROCEDURE — 99203 OFFICE O/P NEW LOW 30 MIN: CPT | Mod: 25

## 2025-04-03 PROCEDURE — 17110 DESTRUCTION B9 LES UP TO 14: CPT

## 2025-04-10 ENCOUNTER — NON-APPOINTMENT (OUTPATIENT)
Age: 70
End: 2025-04-10

## 2025-04-10 ENCOUNTER — APPOINTMENT (OUTPATIENT)
Dept: OPHTHALMOLOGY | Facility: CLINIC | Age: 70
End: 2025-04-10
Payer: MEDICARE

## 2025-04-10 PROCEDURE — 92025 CPTRIZED CORNEAL TOPOGRAPHY: CPT

## 2025-04-10 PROCEDURE — 92250 FUNDUS PHOTOGRAPHY W/I&R: CPT

## 2025-04-10 PROCEDURE — 92136 OPHTHALMIC BIOMETRY: CPT

## 2025-04-10 PROCEDURE — 92004 COMPRE OPH EXAM NEW PT 1/>: CPT

## 2025-08-04 ENCOUNTER — NON-APPOINTMENT (OUTPATIENT)
Age: 70
End: 2025-08-04

## 2025-08-05 ENCOUNTER — RESULT REVIEW (OUTPATIENT)
Age: 70
End: 2025-08-05

## 2025-08-05 ENCOUNTER — OUTPATIENT (OUTPATIENT)
Dept: OUTPATIENT SERVICES | Facility: HOSPITAL | Age: 70
LOS: 1 days | End: 2025-08-05
Payer: MEDICARE

## 2025-08-05 ENCOUNTER — APPOINTMENT (OUTPATIENT)
Dept: ORTHOPEDIC SURGERY | Facility: CLINIC | Age: 70
End: 2025-08-05
Payer: MEDICARE

## 2025-08-05 VITALS
SYSTOLIC BLOOD PRESSURE: 104 MMHG | DIASTOLIC BLOOD PRESSURE: 69 MMHG | WEIGHT: 190 LBS | HEIGHT: 67 IN | BODY MASS INDEX: 29.82 KG/M2 | HEART RATE: 74 BPM | OXYGEN SATURATION: 94 %

## 2025-08-05 DIAGNOSIS — Z87.39 PERSONAL HISTORY OF OTHER DISEASES OF THE MUSCULOSKELETAL SYSTEM AND CONNECTIVE TISSUE: ICD-10-CM

## 2025-08-05 DIAGNOSIS — Z60.2 PROBLEMS RELATED TO LIVING ALONE: ICD-10-CM

## 2025-08-05 DIAGNOSIS — M47.816 SPONDYLOSIS W/OUT MYELOPATHY OR RADICULOPATHY, LUMBAR REGION: ICD-10-CM

## 2025-08-05 DIAGNOSIS — M16.0 BILATERAL PRIMARY OSTEOARTHRITIS OF HIP: ICD-10-CM

## 2025-08-05 DIAGNOSIS — Z78.9 OTHER SPECIFIED HEALTH STATUS: Chronic | ICD-10-CM

## 2025-08-05 DIAGNOSIS — Z78.9 OTHER SPECIFIED HEALTH STATUS: ICD-10-CM

## 2025-08-05 PROCEDURE — 73521 X-RAY EXAM HIPS BI 2 VIEWS: CPT

## 2025-08-05 PROCEDURE — 72020 X-RAY EXAM OF SPINE 1 VIEW: CPT | Mod: 26

## 2025-08-05 PROCEDURE — 72020 X-RAY EXAM OF SPINE 1 VIEW: CPT

## 2025-08-05 PROCEDURE — 73521 X-RAY EXAM HIPS BI 2 VIEWS: CPT | Mod: 26

## 2025-08-05 PROCEDURE — 99204 OFFICE O/P NEW MOD 45 MIN: CPT

## 2025-08-05 RX ORDER — CELECOXIB 100 MG/1
100 CAPSULE ORAL TWICE DAILY
Qty: 60 | Refills: 2 | Status: ACTIVE | COMMUNITY
Start: 2025-08-05 | End: 1900-01-01

## 2025-08-05 SDOH — SOCIAL STABILITY - SOCIAL INSECURITY: PROBLEMS RELATED TO LIVING ALONE: Z60.2

## 2025-08-14 ENCOUNTER — APPOINTMENT (OUTPATIENT)
Dept: VASCULAR SURGERY | Facility: CLINIC | Age: 70
End: 2025-08-14
Payer: MEDICARE

## 2025-08-14 ENCOUNTER — NON-APPOINTMENT (OUTPATIENT)
Age: 70
End: 2025-08-14

## 2025-08-14 VITALS
SYSTOLIC BLOOD PRESSURE: 113 MMHG | HEIGHT: 67 IN | WEIGHT: 190 LBS | DIASTOLIC BLOOD PRESSURE: 75 MMHG | BODY MASS INDEX: 29.82 KG/M2 | HEART RATE: 65 BPM

## 2025-08-14 DIAGNOSIS — Z78.9 OTHER SPECIFIED HEALTH STATUS: ICD-10-CM

## 2025-08-14 DIAGNOSIS — I86.8 VARICOSE VEINS OF OTHER SPECIFIED SITES: ICD-10-CM

## 2025-08-14 PROCEDURE — 99204 OFFICE O/P NEW MOD 45 MIN: CPT

## 2025-08-14 PROCEDURE — 93970 EXTREMITY STUDY: CPT

## 2025-08-15 PROBLEM — I86.8 SPIDER VARICOSE VEINS: Status: ACTIVE | Noted: 2025-08-15

## 2025-08-27 ENCOUNTER — APPOINTMENT (OUTPATIENT)
Dept: MRI IMAGING | Facility: CLINIC | Age: 70
End: 2025-08-27
Payer: MEDICARE

## 2025-08-27 PROCEDURE — 72148 MRI LUMBAR SPINE W/O DYE: CPT

## 2025-08-28 ENCOUNTER — NON-APPOINTMENT (OUTPATIENT)
Age: 70
End: 2025-08-28

## 2025-08-28 DIAGNOSIS — M46.1 SACROILIITIS, NOT ELSEWHERE CLASSIFIED: ICD-10-CM

## 2025-09-15 ENCOUNTER — APPOINTMENT (OUTPATIENT)
Dept: VASCULAR SURGERY | Facility: CLINIC | Age: 70
End: 2025-09-15
Payer: MEDICARE

## 2025-09-15 VITALS
OXYGEN SATURATION: 97 % | BODY MASS INDEX: 29.82 KG/M2 | HEIGHT: 67 IN | DIASTOLIC BLOOD PRESSURE: 70 MMHG | HEART RATE: 80 BPM | WEIGHT: 190 LBS | SYSTOLIC BLOOD PRESSURE: 118 MMHG | TEMPERATURE: 97 F

## 2025-09-15 DIAGNOSIS — I83.813 VARICOSE VEINS OF BILATERAL LOWER EXTREMITIES WITH PAIN: ICD-10-CM

## 2025-09-15 DIAGNOSIS — M79.605 PAIN IN RIGHT LEG: ICD-10-CM

## 2025-09-15 DIAGNOSIS — M79.604 PAIN IN RIGHT LEG: ICD-10-CM

## 2025-09-15 DIAGNOSIS — I83.893 VARICOSE VEINS OF BILATERAL LOWER EXTREMITIES WITH OTHER COMPLICATIONS: ICD-10-CM

## 2025-09-15 PROCEDURE — 99213 OFFICE O/P EST LOW 20 MIN: CPT

## 2025-09-16 ENCOUNTER — APPOINTMENT (OUTPATIENT)
Dept: PHYSICAL MEDICINE AND REHAB | Facility: CLINIC | Age: 70
End: 2025-09-16
Payer: MEDICARE

## 2025-09-16 VITALS
BODY MASS INDEX: 29.82 KG/M2 | SYSTOLIC BLOOD PRESSURE: 118 MMHG | HEART RATE: 70 BPM | DIASTOLIC BLOOD PRESSURE: 77 MMHG | HEIGHT: 67 IN | WEIGHT: 190 LBS | TEMPERATURE: 98.1 F | OXYGEN SATURATION: 96 %

## 2025-09-16 DIAGNOSIS — M54.16 RADICULOPATHY, LUMBAR REGION: ICD-10-CM

## 2025-09-16 DIAGNOSIS — M67.959 UNSPECIFIED DISORDER OF SYNOVIUM AND TENDON, UNSPECIFIED THIGH: ICD-10-CM

## 2025-09-16 DIAGNOSIS — M19.90 UNSPECIFIED OSTEOARTHRITIS, UNSPECIFIED SITE: ICD-10-CM

## 2025-09-16 DIAGNOSIS — M24.559 CONTRACTURE, UNSPECIFIED HIP: ICD-10-CM

## 2025-09-16 DIAGNOSIS — R52 PAIN, UNSPECIFIED: ICD-10-CM

## 2025-09-16 DIAGNOSIS — R29.898 OTHER SYMPTOMS AND SIGNS INVOLVING THE MUSCULOSKELETAL SYSTEM: ICD-10-CM

## 2025-09-16 DIAGNOSIS — G62.81 CRITICAL ILLNESS POLYNEUROPATHY: ICD-10-CM

## 2025-09-16 DIAGNOSIS — G72.81 CRITICAL ILLNESS MYOPATHY: ICD-10-CM

## 2025-09-16 PROCEDURE — 99204 OFFICE O/P NEW MOD 45 MIN: CPT

## 2025-09-16 PROCEDURE — G2211 COMPLEX E/M VISIT ADD ON: CPT
